# Patient Record
Sex: MALE | Race: ASIAN | NOT HISPANIC OR LATINO | URBAN - METROPOLITAN AREA
[De-identification: names, ages, dates, MRNs, and addresses within clinical notes are randomized per-mention and may not be internally consistent; named-entity substitution may affect disease eponyms.]

---

## 2021-06-01 VITALS
DIASTOLIC BLOOD PRESSURE: 66 MMHG | HEART RATE: 52 BPM | OXYGEN SATURATION: 98 % | RESPIRATION RATE: 16 BRPM | TEMPERATURE: 98 F | SYSTOLIC BLOOD PRESSURE: 141 MMHG

## 2021-06-01 NOTE — H&P ADULT - NSHPLABSRESULTS_GEN_ALL_CORE
13.8   7.62  )-----------( 223      ( 02 Jun 2021 09:58 )             42.7   PT/INR - ( 02 Jun 2021 09:58 )   PT: 10.8 sec;   INR: 0.90          PTT - ( 02 Jun 2021 09:58 )  PTT:31.2 sec 13.8   7.62  )-----------( 223      ( 02 Jun 2021 09:58 )             42.7   PT/INR - ( 02 Jun 2021 09:58 )   PT: 10.8 sec;   INR: 0.90          PTT - ( 02 Jun 2021 09:58 )  PTT:31.2 sec    06-02    140  |  102  |  15  ----------------------------<  147<H>  4.2   |  25  |  1.63<H>    Ca    9.6      02 Jun 2021 09:58    TPro  7.8  /  Alb  4.4  /  TBili  0.3  /  DBili  x   /  AST  17  /  ALT  20  /  AlkPhos  101  06-02

## 2021-06-01 NOTE — H&P ADULT - HISTORY OF PRESENT ILLNESS
COVID: _________   Cardiologist: Dr. Ray Albrecht  Pharmacy: [facility], [phone]  Escort: ________    60 y/o male, current smoker, w/ PMHx HTN, HLD, type II DM, CAD s/p CABG (in 2003 w/ 3 subsequent caths and 2 subsequent interventions, most recently in 2020 at [facility]), and known carotid bruit (mild plaque and patent flow bilaterally by ultrasound in 04/2021) who presented to outpatient cardiologist, Dr. Ray Albrecht, c/o intermittent, substernal chest pain, described as pressure and non-radiating, gradually worsening, experienced w/ exertion of less than 2 blocks and 1 flight of stairs, and relieved w/ rest. Patient also reports associated dyspnea w/ moderate physical exertion and relieved w/ rest. Patient denies any dizziness, syncope, palpitations, orthopnea/PND, abdominal pain, nausea/vomiting/diarrhea, melena, LE edema, fevers/chills/cough, recent travel, and known sick contacts. Echocardiogram (04/12/2021) showed EF 50%, global LV hypokinesis, grade I diastolic dysfunction, moderate concentric LV hypertrophy, mild TR, mildly dilated  LA, no pericardial effusion, no pleural effusion, and no pulmonary hypertension. Nuclear Stress (04/12/2021) revealed small perfusion abnormality of mild intensity in inferior region which was reversible in rest images, post-stress EF 42%, and global hypokinesis.     In light of patient's risk factors, CCS Class III anginal symptoms, and abnormal Nuclear Stress results, patient is now referred for cardiac catheterization w/ possible intervention if clinically indicated.  COVID: _________   Cardiologist: Dr. Ray Albrecht  Pharmacy: [facility], [phone]  Escort: ________    Medication list faxed from pharmacy   60 y/o male, current smoker, w/ PMHx HTN, HLD, type II DM, CAD s/p CABG (in 2003 w/ 3 subsequent caths and 2 subsequent interventions, most recently in 2020 at [facility]), and known carotid bruit (mild plaque and patent flow bilaterally by ultrasound in 04/2021) who presented to outpatient cardiologist, Dr. Ray Albrecht, c/o intermittent, substernal chest pain, described as pressure and non-radiating, gradually worsening, experienced w/ exertion of less than 2 blocks and 1 flight of stairs, and relieved w/ rest. Patient also reports associated dyspnea w/ moderate physical exertion and relieved w/ rest. Patient denies any dizziness, syncope, palpitations, orthopnea/PND, abdominal pain, nausea/vomiting/diarrhea, melena, LE edema, fevers/chills/cough, recent travel, and known sick contacts. Echocardiogram (04/12/2021) showed EF 50%, global LV hypokinesis, grade I diastolic dysfunction, moderate concentric LV hypertrophy, mild TR, mildly dilated  LA, no pericardial effusion, no pleural effusion, and no pulmonary hypertension. Nuclear Stress (04/12/2021) revealed small perfusion abnormality of mild intensity in inferior region which was reversible in rest images, post-stress EF 42%, and global hypokinesis.     In light of patient's risk factors, CCS Class III anginal symptoms, and abnormal Nuclear Stress results, patient is now referred for cardiac catheterization w/ possible intervention if clinically indicated.  COVID: _________   Cardiologist: Dr. Ray Albrecht  Pharmacy: [facility], [phone]  Escort: ________      CR 1.5 from labs!! HYDRATION !!    Medication list faxed from pharmacy   62 y/o male, current smoker, w/ PMHx HTN, HLD, type II DM, CAD s/p CABG (in 2003 w/ 3 subsequent caths and 2 subsequent interventions, most recently in 2020 at [facility]), and known carotid bruit (mild plaque and patent flow bilaterally by ultrasound in 04/2021) who presented to outpatient cardiologist, Dr. Ray Albrecht, c/o intermittent, substernal chest pain, described as pressure and non-radiating, gradually worsening, experienced w/ exertion of less than 2 blocks and 1 flight of stairs, and relieved w/ rest. Patient also reports associated dyspnea w/ moderate physical exertion and relieved w/ rest. Patient denies any dizziness, syncope, palpitations, orthopnea/PND, abdominal pain, nausea/vomiting/diarrhea, melena, LE edema, fevers/chills/cough, recent travel, and known sick contacts. Echocardiogram (04/12/2021) showed EF 50%, global LV hypokinesis, grade I diastolic dysfunction, moderate concentric LV hypertrophy, mild TR, mildly dilated  LA, no pericardial effusion, no pleural effusion, and no pulmonary hypertension. Nuclear Stress (04/12/2021) revealed small perfusion abnormality of mild intensity in inferior region which was reversible in rest images, post-stress EF 42%, and global hypokinesis.     In light of patient's risk factors, CCS Class III anginal symptoms, and abnormal Nuclear Stress results, patient is now referred for cardiac catheterization w/ possible intervention if clinically indicated.  COVID: Moderna X 2 (last dose 4/29/21)   Cardiologist: Dr. Ray Albrecht  Pharmacy: [facility], [phone]      CR 1.5 from labs!! HYDRATION !!    Medication list faxed from pharmacy reviewed with patient  62 y/o male, current smoker, w/ PMHx HTN, HLD, type II DM, CAD s/p CABG (in 2003 w/ 3 subsequent caths and 2 subsequent interventions, most recently in 2020 at [facility]), and known carotid bruit (mild plaque and patent flow bilaterally by ultrasound in 04/2021) who presented to outpatient cardiologist, Dr. Ray Albrecht, c/o intermittent, substernal chest pain, described as pressure and non-radiating, gradually worsening, experienced w/ exertion of less than 2 blocks and 1 flight of stairs, and relieved w/ rest. Patient also reports associated dyspnea w/ moderate physical exertion and relieved w/ rest. Patient denies any dizziness, syncope, palpitations, orthopnea/PND, abdominal pain, nausea/vomiting/diarrhea, melena, LE edema, fevers/chills/cough, recent travel, and known sick contacts. Echocardiogram (04/12/2021) showed EF 50%, global LV hypokinesis, grade I diastolic dysfunction, moderate concentric LV hypertrophy, mild TR, mildly dilated  LA, no pericardial effusion, no pleural effusion, and no pulmonary hypertension. Nuclear Stress (04/12/2021) revealed small perfusion abnormality of mild intensity in inferior region which was reversible in rest images, post-stress EF 42%, and global hypokinesis.     In light of patient's risk factors, CCS Class III anginal symptoms, and abnormal Nuclear Stress results, patient is now referred for cardiac catheterization w/ possible intervention if clinically indicated.

## 2021-06-01 NOTE — H&P ADULT - ADDITIONAL PE
EKG today: SB @ 54 with TWI v3-v6, AVL  o/p EKG: SB with LVH, ST depression v3-v6 and AVL, ST elevation lead 3

## 2021-06-01 NOTE — H&P ADULT - NSICDXPASTMEDICALHX_GEN_ALL_CORE_FT
PAST MEDICAL HISTORY:  CAD (coronary artery disease)     Diabetes     Gastritis     Hyperlipemia     Hypertension     S/P CABG (coronary artery bypass graft) in 2003, [facility]

## 2021-06-01 NOTE — H&P ADULT - NSHPSOCIALHISTORY_GEN_ALL_CORE
Patient is a current everyday smoker. current everyday smoker. smokes 5 cig /day x 20 years  denies any ETOH or illicit drug use

## 2021-06-01 NOTE — H&P ADULT - NSICDXFAMILYHX_GEN_ALL_CORE_FT
FAMILY HISTORY:  Sibling  Still living? Unknown  FH: CAD (coronary artery disease), Age at diagnosis: Age Unknown

## 2021-06-01 NOTE — H&P ADULT - ASSESSMENT
60 y/o male, current smoker, w/ PMHx HTN, HLD, type II DM, CAD s/p CABG (in 2003 w/ 3 subsequent caths and 2 subsequent interventions, most recently in 2020 at [facility]), and known carotid bruit (mild plaque and patent flow bilaterally by ultrasound in 04/2021) who is now referred for cardiac catheterization w/ possible intervention if clinically indicated 2/2 patient's risk factors, CCS Class III anginal symptoms, and abnormal Nuclear Stress results,      H/H 13.8/42.7 . Pt denies bleeding, GI bleeding, hematemesis, hematuria, BRBPR or melena .Pt. loaded with  mg Po x 1 and Plavix 600 mg PO x 1 pre-cath.  Cr. from o/p lab 1.5; IV NS@100  cc/hr pre-cath.  Pt. precath/consented with [employer] ID # [id number]    Risks & benefits of procedure and alternative therapy have been explained to the patient including but not limited to: allergic reaction, bleeding w/possible need for blood transfusion, infection, renal and vascular compromise, limb damage, arrhythmia, stroke, vessel dissection/perforation, Myocardial infarction, emergent CABG. Informed consent obtained and in chart   62 y/o male, current smoker, w/ PMHx HTN, HLD, type II DM, CAD s/p CABG (in 2003 w/ 3 subsequent caths and 2 subsequent interventions, most recently in 2020 at [facility]), and known carotid bruit (mild plaque and patent flow bilaterally by ultrasound in 04/2021) who is now referred for cardiac catheterization w/ possible intervention if clinically indicated 2/2 patient's risk factors, CCS Class III anginal symptoms, and abnormal Nuclear Stress results,      H/H 13.8/42.7 . Pt denies bleeding, GI bleeding, hematemesis, hematuria, BRBPR or melena .Pt. loaded with  mg Po x 1 and Plavix 600 mg PO x 1 pre-cath.  Cr. 1.6 today; Cr from o/p lab 1.5; IV  CC bolus X 1 to be c/w NS @100  cc/hr pre-cath.  Pt. precath/consented with [employer] ID # [id number]    Risks & benefits of procedure and alternative therapy have been explained to the patient including but not limited to: allergic reaction, bleeding w/possible need for blood transfusion, infection, renal and vascular compromise, limb damage, arrhythmia, stroke, vessel dissection/perforation, Myocardial infarction, emergent CABG. Informed consent obtained and in chart

## 2021-06-02 ENCOUNTER — OUTPATIENT (OUTPATIENT)
Dept: OUTPATIENT SERVICES | Facility: HOSPITAL | Age: 61
LOS: 1 days | Discharge: NURSING, REHAB OR LONG-TERM CARE | End: 2021-06-02
Payer: MEDICAID

## 2021-06-02 DIAGNOSIS — Z95.1 PRESENCE OF AORTOCORONARY BYPASS GRAFT: Chronic | ICD-10-CM

## 2021-06-02 LAB
A1C WITH ESTIMATED AVERAGE GLUCOSE RESULT: 7.9 % — HIGH (ref 4–5.6)
ALBUMIN SERPL ELPH-MCNC: 4.4 G/DL — SIGNIFICANT CHANGE UP (ref 3.3–5)
ALP SERPL-CCNC: 101 U/L — SIGNIFICANT CHANGE UP (ref 40–120)
ALT FLD-CCNC: 20 U/L — SIGNIFICANT CHANGE UP (ref 10–45)
ANION GAP SERPL CALC-SCNC: 13 MMOL/L — SIGNIFICANT CHANGE UP (ref 5–17)
APTT BLD: 31.2 SEC — SIGNIFICANT CHANGE UP (ref 27.5–35.5)
AST SERPL-CCNC: 17 U/L — SIGNIFICANT CHANGE UP (ref 10–40)
BASOPHILS # BLD AUTO: 0.07 K/UL — SIGNIFICANT CHANGE UP (ref 0–0.2)
BASOPHILS NFR BLD AUTO: 0.9 % — SIGNIFICANT CHANGE UP (ref 0–2)
BILIRUB SERPL-MCNC: 0.3 MG/DL — SIGNIFICANT CHANGE UP (ref 0.2–1.2)
BUN SERPL-MCNC: 15 MG/DL — SIGNIFICANT CHANGE UP (ref 7–23)
CALCIUM SERPL-MCNC: 9.6 MG/DL — SIGNIFICANT CHANGE UP (ref 8.4–10.5)
CHLORIDE SERPL-SCNC: 102 MMOL/L — SIGNIFICANT CHANGE UP (ref 96–108)
CHOLEST SERPL-MCNC: 147 MG/DL — SIGNIFICANT CHANGE UP
CK MB CFR SERPL CALC: 2.7 NG/ML — SIGNIFICANT CHANGE UP (ref 0–6.7)
CK SERPL-CCNC: 91 U/L — SIGNIFICANT CHANGE UP (ref 30–200)
CO2 SERPL-SCNC: 25 MMOL/L — SIGNIFICANT CHANGE UP (ref 22–31)
CREAT SERPL-MCNC: 1.63 MG/DL — HIGH (ref 0.5–1.3)
EOSINOPHIL # BLD AUTO: 0.57 K/UL — HIGH (ref 0–0.5)
EOSINOPHIL NFR BLD AUTO: 7.5 % — HIGH (ref 0–6)
ESTIMATED AVERAGE GLUCOSE: 180 MG/DL — HIGH (ref 68–114)
GLUCOSE SERPL-MCNC: 147 MG/DL — HIGH (ref 70–99)
HCT VFR BLD CALC: 42.7 % — SIGNIFICANT CHANGE UP (ref 39–50)
HDLC SERPL-MCNC: 50 MG/DL — SIGNIFICANT CHANGE UP
HGB BLD-MCNC: 13.8 G/DL — SIGNIFICANT CHANGE UP (ref 13–17)
IMM GRANULOCYTES NFR BLD AUTO: 0.3 % — SIGNIFICANT CHANGE UP (ref 0–1.5)
INR BLD: 0.9 — SIGNIFICANT CHANGE UP (ref 0.88–1.16)
LIPID PNL WITH DIRECT LDL SERPL: 60 MG/DL — SIGNIFICANT CHANGE UP
LYMPHOCYTES # BLD AUTO: 1.85 K/UL — SIGNIFICANT CHANGE UP (ref 1–3.3)
LYMPHOCYTES # BLD AUTO: 24.3 % — SIGNIFICANT CHANGE UP (ref 13–44)
MCHC RBC-ENTMCNC: 27.5 PG — SIGNIFICANT CHANGE UP (ref 27–34)
MCHC RBC-ENTMCNC: 32.3 GM/DL — SIGNIFICANT CHANGE UP (ref 32–36)
MCV RBC AUTO: 85.1 FL — SIGNIFICANT CHANGE UP (ref 80–100)
MONOCYTES # BLD AUTO: 0.59 K/UL — SIGNIFICANT CHANGE UP (ref 0–0.9)
MONOCYTES NFR BLD AUTO: 7.7 % — SIGNIFICANT CHANGE UP (ref 2–14)
NEUTROPHILS # BLD AUTO: 4.52 K/UL — SIGNIFICANT CHANGE UP (ref 1.8–7.4)
NEUTROPHILS NFR BLD AUTO: 59.3 % — SIGNIFICANT CHANGE UP (ref 43–77)
NON HDL CHOLESTEROL: 97 MG/DL — SIGNIFICANT CHANGE UP
NRBC # BLD: 0 /100 WBCS — SIGNIFICANT CHANGE UP (ref 0–0)
PLATELET # BLD AUTO: 223 K/UL — SIGNIFICANT CHANGE UP (ref 150–400)
POTASSIUM SERPL-MCNC: 4.2 MMOL/L — SIGNIFICANT CHANGE UP (ref 3.5–5.3)
POTASSIUM SERPL-SCNC: 4.2 MMOL/L — SIGNIFICANT CHANGE UP (ref 3.5–5.3)
PROT SERPL-MCNC: 7.8 G/DL — SIGNIFICANT CHANGE UP (ref 6–8.3)
PROTHROM AB SERPL-ACNC: 10.8 SEC — SIGNIFICANT CHANGE UP (ref 10.6–13.6)
RBC # BLD: 5.02 M/UL — SIGNIFICANT CHANGE UP (ref 4.2–5.8)
RBC # FLD: 15.4 % — HIGH (ref 10.3–14.5)
SODIUM SERPL-SCNC: 140 MMOL/L — SIGNIFICANT CHANGE UP (ref 135–145)
TRIGL SERPL-MCNC: 187 MG/DL — HIGH
WBC # BLD: 7.62 K/UL — SIGNIFICANT CHANGE UP (ref 3.8–10.5)
WBC # FLD AUTO: 7.62 K/UL — SIGNIFICANT CHANGE UP (ref 3.8–10.5)

## 2021-06-02 PROCEDURE — 82553 CREATINE MB FRACTION: CPT

## 2021-06-02 PROCEDURE — 85025 COMPLETE CBC W/AUTO DIFF WBC: CPT

## 2021-06-02 PROCEDURE — C1725: CPT

## 2021-06-02 PROCEDURE — 80061 LIPID PANEL: CPT

## 2021-06-02 PROCEDURE — 80053 COMPREHEN METABOLIC PANEL: CPT

## 2021-06-02 PROCEDURE — 83036 HEMOGLOBIN GLYCOSYLATED A1C: CPT

## 2021-06-02 PROCEDURE — 85730 THROMBOPLASTIN TIME PARTIAL: CPT

## 2021-06-02 PROCEDURE — 93455 CORONARY ART/GRFT ANGIO S&I: CPT

## 2021-06-02 PROCEDURE — C1894: CPT

## 2021-06-02 PROCEDURE — 99152 MOD SED SAME PHYS/QHP 5/>YRS: CPT

## 2021-06-02 PROCEDURE — C1769: CPT

## 2021-06-02 PROCEDURE — 99153 MOD SED SAME PHYS/QHP EA: CPT

## 2021-06-02 PROCEDURE — C1887: CPT

## 2021-06-02 PROCEDURE — 93005 ELECTROCARDIOGRAM TRACING: CPT

## 2021-06-02 PROCEDURE — 82550 ASSAY OF CK (CPK): CPT

## 2021-06-02 PROCEDURE — 93010 ELECTROCARDIOGRAM REPORT: CPT

## 2021-06-02 PROCEDURE — 85610 PROTHROMBIN TIME: CPT

## 2021-06-02 RX ORDER — METFORMIN HYDROCHLORIDE 850 MG/1
1 TABLET ORAL
Qty: 0 | Refills: 0 | DISCHARGE

## 2021-06-02 RX ORDER — RANOLAZINE 500 MG/1
1 TABLET, FILM COATED, EXTENDED RELEASE ORAL
Qty: 0 | Refills: 0 | DISCHARGE

## 2021-06-02 RX ORDER — SODIUM CHLORIDE 9 MG/ML
500 INJECTION INTRAMUSCULAR; INTRAVENOUS; SUBCUTANEOUS
Refills: 0 | Status: DISCONTINUED | OUTPATIENT
Start: 2021-06-02 | End: 2021-06-02

## 2021-06-02 RX ORDER — NICOTINE POLACRILEX 2 MG
1 GUM BUCCAL
Qty: 0 | Refills: 0 | DISCHARGE

## 2021-06-02 RX ORDER — NITROGLYCERIN 6.5 MG
0 CAPSULE, EXTENDED RELEASE ORAL
Qty: 0 | Refills: 0 | DISCHARGE

## 2021-06-02 RX ORDER — ATORVASTATIN CALCIUM 80 MG/1
1 TABLET, FILM COATED ORAL
Qty: 0 | Refills: 0 | DISCHARGE

## 2021-06-02 RX ORDER — CLOPIDOGREL BISULFATE 75 MG/1
600 TABLET, FILM COATED ORAL ONCE
Refills: 0 | Status: COMPLETED | OUTPATIENT
Start: 2021-06-02 | End: 2021-06-02

## 2021-06-02 RX ORDER — ASPIRIN/CALCIUM CARB/MAGNESIUM 324 MG
325 TABLET ORAL ONCE
Refills: 0 | Status: COMPLETED | OUTPATIENT
Start: 2021-06-02 | End: 2021-06-02

## 2021-06-02 RX ORDER — VALSARTAN 80 MG/1
1 TABLET ORAL
Qty: 0 | Refills: 0 | DISCHARGE

## 2021-06-02 RX ORDER — ACETAMINOPHEN 500 MG
650 TABLET ORAL ONCE
Refills: 0 | Status: COMPLETED | OUTPATIENT
Start: 2021-06-02 | End: 2021-06-02

## 2021-06-02 RX ORDER — ASPIRIN/CALCIUM CARB/MAGNESIUM 324 MG
1 TABLET ORAL
Qty: 0 | Refills: 0 | DISCHARGE

## 2021-06-02 RX ORDER — SODIUM CHLORIDE 9 MG/ML
250 INJECTION INTRAMUSCULAR; INTRAVENOUS; SUBCUTANEOUS ONCE
Refills: 0 | Status: COMPLETED | OUTPATIENT
Start: 2021-06-02 | End: 2021-06-02

## 2021-06-02 RX ORDER — AMLODIPINE BESYLATE 2.5 MG/1
1 TABLET ORAL
Qty: 0 | Refills: 0 | DISCHARGE

## 2021-06-02 RX ORDER — CHLORHEXIDINE GLUCONATE 213 G/1000ML
1 SOLUTION TOPICAL ONCE
Refills: 0 | Status: DISCONTINUED | OUTPATIENT
Start: 2021-06-02 | End: 2021-06-02

## 2021-06-02 RX ADMIN — Medication 650 MILLIGRAM(S): at 17:20

## 2021-06-02 RX ADMIN — CLOPIDOGREL BISULFATE 600 MILLIGRAM(S): 75 TABLET, FILM COATED ORAL at 10:41

## 2021-06-02 RX ADMIN — SODIUM CHLORIDE 500 MILLILITER(S): 9 INJECTION INTRAMUSCULAR; INTRAVENOUS; SUBCUTANEOUS at 10:52

## 2021-06-02 RX ADMIN — Medication 325 MILLIGRAM(S): at 10:41

## 2021-06-02 RX ADMIN — SODIUM CHLORIDE 100 MILLILITER(S): 9 INJECTION INTRAMUSCULAR; INTRAVENOUS; SUBCUTANEOUS at 10:41

## 2021-06-02 NOTE — PROGRESS NOTE ADULT - SUBJECTIVE AND OBJECTIVE BOX
Interventional Cardiology PA SDA Discharge Note    Patient without complaints. Ambulated and voided without difficulties    Afebrile, VSS    Ext: Left Ulnar: no hematoma, no bleeding, dressing; C/D/I, intact pulses     A/P:  62 y/o male, current smoker, w/ PMHx HTN, HLD, type II DM, CAD s/p CABG (in 2003 w/ 3 subsequent caths and 2 subsequent interventions, most recently in 2020 at [facility]), and known carotid bruit (mild plaque and patent flow bilaterally by ultrasound in 04/2021) who is now referred for cardiac catheterization w/ possible intervention if clinically indicated 2/2 patient's risk factors, CCS Class III anginal symptoms, and abnormal Nuclear Stress results.  Pt is now s/p cardiac cath on 6/2/21 w/ unsuccessful PCI to pLCx . Other findings: patent LIMA-LAD, patent Rad-Diag, SVG-RPDA, EDP 9. Plan to return in 4 weeks for staged PCI of pLCX .    1.	Stable for discharge as per attending Dr. Albrecht after bed rest, pt voids, wrist stable and 30 minutes of ambulation.  2.	Follow-up with PMD/Cardiologist Dr Albrecht in 1-2 weeks  3.	Discharged forms signed and copies in chart    Interventional Cardiology PA SDA Discharge Note    Patient without complaints. Ambulated and voided without difficulties    Afebrile, VSS    Ext: Left Ulnar: no hematoma, no bleeding, dressing; C/D/I, intact pulses     A/P:  62 y/o male, current smoker, w/ PMHx HTN, HLD, type II DM, CAD s/p CABG (in 2003 w/ 3 subsequent caths and 2 subsequent interventions, most recently in 2020 at [facility]), and known carotid bruit (mild plaque and patent flow bilaterally by ultrasound in 04/2021) who is now referred for cardiac catheterization w/ possible intervention if clinically indicated 2/2 patient's risk factors, CCS Class III anginal symptoms, and abnormal Nuclear Stress results.  Pt is now s/p cardiac cath on 6/2/21 w/ unsuccessful PCI to pLCx . Other findings: LM mild luminal irregularities, mLAD 100%, D1 95%, pLCx  w/ L-L collaterals, pRCA 100%, patent LIMA-LAD, patent free Rad-D1, SVG-RPDA, EDP 9. Plan to return in 4-6 weeks for staged PCI of pLCX .    1.	Stable for discharge as per attending Dr. Albrecht after bed rest, pt voids, wrist stable and 30 minutes of ambulation.  2.	Follow-up with PMD/Cardiologist Dr Albrecht in 1-2 weeks  3.	Discharged forms signed and copies in chart

## 2021-06-04 DIAGNOSIS — I25.10 ATHEROSCLEROTIC HEART DISEASE OF NATIVE CORONARY ARTERY WITHOUT ANGINA PECTORIS: ICD-10-CM

## 2021-06-04 DIAGNOSIS — Z95.1 PRESENCE OF AORTOCORONARY BYPASS GRAFT: ICD-10-CM

## 2021-06-04 DIAGNOSIS — I25.84 CORONARY ATHEROSCLEROSIS DUE TO CALCIFIED CORONARY LESION: ICD-10-CM

## 2021-06-04 DIAGNOSIS — Z82.49 FAMILY HISTORY OF ISCHEMIC HEART DISEASE AND OTHER DISEASES OF THE CIRCULATORY SYSTEM: ICD-10-CM

## 2021-06-05 ENCOUNTER — INPATIENT (INPATIENT)
Facility: HOSPITAL | Age: 61
LOS: 3 days | Discharge: HOME | End: 2021-06-09
Attending: HOSPITALIST | Admitting: HOSPITALIST
Payer: MEDICAID

## 2021-06-05 VITALS
HEART RATE: 78 BPM | SYSTOLIC BLOOD PRESSURE: 114 MMHG | OXYGEN SATURATION: 100 % | DIASTOLIC BLOOD PRESSURE: 63 MMHG | TEMPERATURE: 99 F | RESPIRATION RATE: 16 BRPM | HEIGHT: 67 IN

## 2021-06-05 DIAGNOSIS — Z95.1 PRESENCE OF AORTOCORONARY BYPASS GRAFT: Chronic | ICD-10-CM

## 2021-06-05 DIAGNOSIS — R07.9 CHEST PAIN, UNSPECIFIED: ICD-10-CM

## 2021-06-05 DIAGNOSIS — E11.9 TYPE 2 DIABETES MELLITUS WITHOUT COMPLICATIONS: ICD-10-CM

## 2021-06-05 DIAGNOSIS — E78.5 HYPERLIPIDEMIA, UNSPECIFIED: ICD-10-CM

## 2021-06-05 DIAGNOSIS — I24.9 ACUTE ISCHEMIC HEART DISEASE, UNSPECIFIED: ICD-10-CM

## 2021-06-05 DIAGNOSIS — N18.9 CHRONIC KIDNEY DISEASE, UNSPECIFIED: ICD-10-CM

## 2021-06-05 DIAGNOSIS — I10 ESSENTIAL (PRIMARY) HYPERTENSION: ICD-10-CM

## 2021-06-05 LAB
A1C WITH ESTIMATED AVERAGE GLUCOSE RESULT: 8.1 % — HIGH (ref 4–5.6)
ALBUMIN SERPL ELPH-MCNC: 4 G/DL — SIGNIFICANT CHANGE UP (ref 3.3–5)
ALP SERPL-CCNC: 90 U/L — SIGNIFICANT CHANGE UP (ref 40–120)
ALT FLD-CCNC: 15 U/L — SIGNIFICANT CHANGE UP (ref 4–41)
ANION GAP SERPL CALC-SCNC: 12 MMOL/L — SIGNIFICANT CHANGE UP (ref 7–14)
APTT BLD: 31.6 SEC — SIGNIFICANT CHANGE UP (ref 27–36.3)
AST SERPL-CCNC: 13 U/L — SIGNIFICANT CHANGE UP (ref 4–40)
BASOPHILS # BLD AUTO: 0.04 K/UL — SIGNIFICANT CHANGE UP (ref 0–0.2)
BASOPHILS NFR BLD AUTO: 0.4 % — SIGNIFICANT CHANGE UP (ref 0–2)
BILIRUB SERPL-MCNC: 0.3 MG/DL — SIGNIFICANT CHANGE UP (ref 0.2–1.2)
BUN SERPL-MCNC: 11 MG/DL — SIGNIFICANT CHANGE UP (ref 7–23)
CALCIUM SERPL-MCNC: 8.8 MG/DL — SIGNIFICANT CHANGE UP (ref 8.4–10.5)
CHLORIDE SERPL-SCNC: 102 MMOL/L — SIGNIFICANT CHANGE UP (ref 98–107)
CHOLEST SERPL-MCNC: 132 MG/DL — SIGNIFICANT CHANGE UP
CK MB BLD-MCNC: 2.9 % — HIGH (ref 0–2.5)
CK MB CFR SERPL CALC: 1.8 NG/ML — SIGNIFICANT CHANGE UP
CK MB CFR SERPL CALC: 2 NG/ML — SIGNIFICANT CHANGE UP
CK SERPL-CCNC: 70 U/L — SIGNIFICANT CHANGE UP (ref 30–200)
CO2 SERPL-SCNC: 22 MMOL/L — SIGNIFICANT CHANGE UP (ref 22–31)
CREAT SERPL-MCNC: 1.42 MG/DL — HIGH (ref 0.5–1.3)
EOSINOPHIL # BLD AUTO: 0.58 K/UL — HIGH (ref 0–0.5)
EOSINOPHIL NFR BLD AUTO: 5.9 % — SIGNIFICANT CHANGE UP (ref 0–6)
ESTIMATED AVERAGE GLUCOSE: 186 MG/DL — HIGH (ref 68–114)
GLUCOSE SERPL-MCNC: 291 MG/DL — HIGH (ref 70–99)
HCT VFR BLD CALC: 36.4 % — LOW (ref 39–50)
HDLC SERPL-MCNC: 49 MG/DL — SIGNIFICANT CHANGE UP
HGB BLD-MCNC: 11.9 G/DL — LOW (ref 13–17)
IANC: 7.07 K/UL — SIGNIFICANT CHANGE UP (ref 1.5–8.5)
IMM GRANULOCYTES NFR BLD AUTO: 0.3 % — SIGNIFICANT CHANGE UP (ref 0–1.5)
INR BLD: 0.95 RATIO — SIGNIFICANT CHANGE UP (ref 0.88–1.16)
LIPID PNL WITH DIRECT LDL SERPL: 63 MG/DL — SIGNIFICANT CHANGE UP
LYMPHOCYTES # BLD AUTO: 1.29 K/UL — SIGNIFICANT CHANGE UP (ref 1–3.3)
LYMPHOCYTES # BLD AUTO: 13.2 % — SIGNIFICANT CHANGE UP (ref 13–44)
MCHC RBC-ENTMCNC: 27.5 PG — SIGNIFICANT CHANGE UP (ref 27–34)
MCHC RBC-ENTMCNC: 32.7 GM/DL — SIGNIFICANT CHANGE UP (ref 32–36)
MCV RBC AUTO: 84.3 FL — SIGNIFICANT CHANGE UP (ref 80–100)
MONOCYTES # BLD AUTO: 0.79 K/UL — SIGNIFICANT CHANGE UP (ref 0–0.9)
MONOCYTES NFR BLD AUTO: 8.1 % — SIGNIFICANT CHANGE UP (ref 2–14)
NEUTROPHILS # BLD AUTO: 7.07 K/UL — SIGNIFICANT CHANGE UP (ref 1.8–7.4)
NEUTROPHILS NFR BLD AUTO: 72.1 % — SIGNIFICANT CHANGE UP (ref 43–77)
NON HDL CHOLESTEROL: 83 MG/DL — SIGNIFICANT CHANGE UP
NRBC # BLD: 0 /100 WBCS — SIGNIFICANT CHANGE UP
NRBC # FLD: 0 K/UL — SIGNIFICANT CHANGE UP
NT-PROBNP SERPL-SCNC: 706 PG/ML — HIGH
PLATELET # BLD AUTO: 199 K/UL — SIGNIFICANT CHANGE UP (ref 150–400)
POTASSIUM SERPL-MCNC: 4.2 MMOL/L — SIGNIFICANT CHANGE UP (ref 3.5–5.3)
POTASSIUM SERPL-SCNC: 4.2 MMOL/L — SIGNIFICANT CHANGE UP (ref 3.5–5.3)
PROT SERPL-MCNC: 7.3 G/DL — SIGNIFICANT CHANGE UP (ref 6–8.3)
PROTHROM AB SERPL-ACNC: 10.9 SEC — SIGNIFICANT CHANGE UP (ref 10.6–13.6)
RBC # BLD: 4.32 M/UL — SIGNIFICANT CHANGE UP (ref 4.2–5.8)
RBC # FLD: 15.2 % — HIGH (ref 10.3–14.5)
SARS-COV-2 RNA SPEC QL NAA+PROBE: SIGNIFICANT CHANGE UP
SODIUM SERPL-SCNC: 136 MMOL/L — SIGNIFICANT CHANGE UP (ref 135–145)
TRIGL SERPL-MCNC: 99 MG/DL — SIGNIFICANT CHANGE UP
TROPONIN T, HIGH SENSITIVITY RESULT: 33 NG/L — SIGNIFICANT CHANGE UP
TROPONIN T, HIGH SENSITIVITY RESULT: 34 NG/L — SIGNIFICANT CHANGE UP
WBC # BLD: 9.8 K/UL — SIGNIFICANT CHANGE UP (ref 3.8–10.5)
WBC # FLD AUTO: 9.8 K/UL — SIGNIFICANT CHANGE UP (ref 3.8–10.5)

## 2021-06-05 PROCEDURE — 99223 1ST HOSP IP/OBS HIGH 75: CPT

## 2021-06-05 PROCEDURE — 99291 CRITICAL CARE FIRST HOUR: CPT | Mod: 25

## 2021-06-05 PROCEDURE — 93010 ELECTROCARDIOGRAM REPORT: CPT

## 2021-06-05 PROCEDURE — 71045 X-RAY EXAM CHEST 1 VIEW: CPT | Mod: 26

## 2021-06-05 RX ORDER — METOPROLOL TARTRATE 50 MG
50 TABLET ORAL DAILY
Refills: 0 | Status: DISCONTINUED | OUTPATIENT
Start: 2021-06-05 | End: 2021-06-06

## 2021-06-05 RX ORDER — SODIUM CHLORIDE 9 MG/ML
1000 INJECTION, SOLUTION INTRAVENOUS
Refills: 0 | Status: DISCONTINUED | OUTPATIENT
Start: 2021-06-05 | End: 2021-06-09

## 2021-06-05 RX ORDER — DEXTROSE 50 % IN WATER 50 %
25 SYRINGE (ML) INTRAVENOUS ONCE
Refills: 0 | Status: DISCONTINUED | OUTPATIENT
Start: 2021-06-05 | End: 2021-06-09

## 2021-06-05 RX ORDER — ENOXAPARIN SODIUM 100 MG/ML
40 INJECTION SUBCUTANEOUS DAILY
Refills: 0 | Status: DISCONTINUED | OUTPATIENT
Start: 2021-06-05 | End: 2021-06-09

## 2021-06-05 RX ORDER — MELOXICAM 15 MG/1
1 TABLET ORAL
Qty: 0 | Refills: 0 | DISCHARGE

## 2021-06-05 RX ORDER — VALSARTAN 80 MG/1
80 TABLET ORAL DAILY
Refills: 0 | Status: DISCONTINUED | OUTPATIENT
Start: 2021-06-05 | End: 2021-06-09

## 2021-06-05 RX ORDER — NICOTINE POLACRILEX 2 MG
1 GUM BUCCAL
Qty: 0 | Refills: 0 | DISCHARGE

## 2021-06-05 RX ORDER — TICAGRELOR 90 MG/1
180 TABLET ORAL ONCE
Refills: 0 | Status: COMPLETED | OUTPATIENT
Start: 2021-06-05 | End: 2021-06-05

## 2021-06-05 RX ORDER — RANOLAZINE 500 MG/1
500 TABLET, FILM COATED, EXTENDED RELEASE ORAL
Refills: 0 | Status: DISCONTINUED | OUTPATIENT
Start: 2021-06-05 | End: 2021-06-09

## 2021-06-05 RX ORDER — ASPIRIN/CALCIUM CARB/MAGNESIUM 324 MG
81 TABLET ORAL DAILY
Refills: 0 | Status: DISCONTINUED | OUTPATIENT
Start: 2021-06-05 | End: 2021-06-09

## 2021-06-05 RX ORDER — DEXTROSE 50 % IN WATER 50 %
12.5 SYRINGE (ML) INTRAVENOUS ONCE
Refills: 0 | Status: DISCONTINUED | OUTPATIENT
Start: 2021-06-05 | End: 2021-06-09

## 2021-06-05 RX ORDER — AMLODIPINE BESYLATE 2.5 MG/1
10 TABLET ORAL DAILY
Refills: 0 | Status: DISCONTINUED | OUTPATIENT
Start: 2021-06-05 | End: 2021-06-09

## 2021-06-05 RX ORDER — INSULIN LISPRO 100/ML
VIAL (ML) SUBCUTANEOUS
Refills: 0 | Status: DISCONTINUED | OUTPATIENT
Start: 2021-06-05 | End: 2021-06-09

## 2021-06-05 RX ORDER — VALSARTAN 80 MG/1
1 TABLET ORAL
Qty: 0 | Refills: 0 | DISCHARGE

## 2021-06-05 RX ORDER — NICOTINE POLACRILEX 2 MG
1 GUM BUCCAL DAILY
Refills: 0 | Status: DISCONTINUED | OUTPATIENT
Start: 2021-06-05 | End: 2021-06-09

## 2021-06-05 RX ORDER — ASPIRIN/CALCIUM CARB/MAGNESIUM 324 MG
324 TABLET ORAL ONCE
Refills: 0 | Status: DISCONTINUED | OUTPATIENT
Start: 2021-06-05 | End: 2021-06-05

## 2021-06-05 RX ORDER — ASPIRIN/CALCIUM CARB/MAGNESIUM 324 MG
243 TABLET ORAL ONCE
Refills: 0 | Status: COMPLETED | OUTPATIENT
Start: 2021-06-05 | End: 2021-06-05

## 2021-06-05 RX ORDER — DEXTROSE 50 % IN WATER 50 %
15 SYRINGE (ML) INTRAVENOUS ONCE
Refills: 0 | Status: DISCONTINUED | OUTPATIENT
Start: 2021-06-05 | End: 2021-06-09

## 2021-06-05 RX ORDER — OMEPRAZOLE 10 MG/1
1 CAPSULE, DELAYED RELEASE ORAL
Qty: 0 | Refills: 0 | DISCHARGE

## 2021-06-05 RX ORDER — ATORVASTATIN CALCIUM 80 MG/1
80 TABLET, FILM COATED ORAL AT BEDTIME
Refills: 0 | Status: DISCONTINUED | OUTPATIENT
Start: 2021-06-05 | End: 2021-06-09

## 2021-06-05 RX ORDER — GLUCAGON INJECTION, SOLUTION 0.5 MG/.1ML
1 INJECTION, SOLUTION SUBCUTANEOUS ONCE
Refills: 0 | Status: DISCONTINUED | OUTPATIENT
Start: 2021-06-05 | End: 2021-06-09

## 2021-06-05 RX ADMIN — ENOXAPARIN SODIUM 40 MILLIGRAM(S): 100 INJECTION SUBCUTANEOUS at 13:16

## 2021-06-05 RX ADMIN — Medication 1 DROP(S): at 23:52

## 2021-06-05 RX ADMIN — ATORVASTATIN CALCIUM 80 MILLIGRAM(S): 80 TABLET, FILM COATED ORAL at 22:52

## 2021-06-05 RX ADMIN — Medication 1: at 13:18

## 2021-06-05 RX ADMIN — RANOLAZINE 500 MILLIGRAM(S): 500 TABLET, FILM COATED, EXTENDED RELEASE ORAL at 18:16

## 2021-06-05 RX ADMIN — Medication 1 PATCH: at 22:49

## 2021-06-05 RX ADMIN — Medication 2: at 16:40

## 2021-06-05 RX ADMIN — Medication 1 PATCH: at 13:17

## 2021-06-05 RX ADMIN — Medication 1 DROP(S): at 13:07

## 2021-06-05 RX ADMIN — Medication 243 MILLIGRAM(S): at 02:36

## 2021-06-05 RX ADMIN — TICAGRELOR 180 MILLIGRAM(S): 90 TABLET ORAL at 02:33

## 2021-06-05 RX ADMIN — Medication 81 MILLIGRAM(S): at 13:17

## 2021-06-05 NOTE — ED PROVIDER NOTE - PHYSICAL EXAMINATION
Gen: AAOx3, non-toxic  Head: NCAT  HEENT: EOMI, PERRLA, oral mucosa moist, normal conjunctiva  Lung: CTAB, no respiratory distress, no wheezes/rhonchi/rales B/L, speaking in full sentences  CV: RRR, no murmurs, rubs or gallops  Abd: soft, NTND, no guarding, no CVA tenderness, no rebound tenderness  MSK: no visible deformities, full range of motion of all 4 exts  Neuro: No focal sensory or motor deficits  Skin: Warm, well perfused, no rash  Psych: normal affect.   ~Tamara Osborn MD

## 2021-06-05 NOTE — ED ADULT NURSE NOTE - OBJECTIVE STATEMENT
Pt is a 61 year old male reporting to the ED for L side chest pain for 2 days.Pt is gilmar montes MD using  ipad.  Has angiogram 2 days ago at [place] and d/c yesterday. Reports L side chest pain increased after angiogram and is radiating to L side. Pt reports pain increased when deep breathing and on exertion. Pt is AOX4. Pt denies SOB. PT respirations even an unlabored. Pt placed on cardiac monitor, EKG done, spo2 100 % on room air. Pt denies fever, chills, n/v/d. Pt denies abdominal pain, dysuria, hematuria. 18 g iv placed in L ac, 20 g iv placed in L wrist by EMS. Labs drawn, pending review, report given to primary RN, jess.

## 2021-06-05 NOTE — PATIENT PROFILE ADULT - BRAND OF COVID-19 VACCINATION
Procedure note    Back abscess was cleaned, pus and sebum evacuated and packed with iodoform. 4 x 4 and tape utilized. He tolerated this well.   Abscess measuring 4 x 4 cm Pfizer dose 1 and 2

## 2021-06-05 NOTE — H&P ADULT - HISTORY OF PRESENT ILLNESS
62 y/o Wolof speaking male w/ PMHx HTN, HLD, type II DM, CKD, current smoker, CAD s/p CABG (in 2003 in [place] w/ 2 subsequent interventions, most recently in 2020 at [facility]), and known carotid bruit (mild plaque and patent flow bilaterally by ultrasound in 04/2021) Pt underwent Cardiac Cath on 6/2/21 in [facility] had unsuccessful PCI to pLCx  now p/w recurrent CP. Pt developed 8/10 severity, pressure/ stabbing on left side of chest radiating to his left arm ~9pm while he was pulling a foam mattress on his bed. He also felt accompanying SOB and mild nausea.  He called EMS and was given 1 SL NTG ~11pm with improvement. Pt admits to feeling similar CP occasionally for the last 4-5 months, experiences the CP & SOB with exertion, doing something or walking 4-5 blocks then symptoms dissipate slowly after stopping but also sometimes uses SL NTG once every 2-3 months when needed. Pt denies diaphoresis, palp's, cough, fevers/ chills or vomiting. No reproducible, pleuritic or dietary component to CP, admits sometimes CP is also positional, worse when lying down.   60 y/o Yakut speaking male w/ PMHx current smoker, CAD s/p CABG (in 2003 in [place] w/ 2 subsequent interventions, most recently in 2020 at [facility]), HTN, HLD, type II DM, ?CKD and known carotid bruit (mild plaque and patent flow bilaterally by ultrasound in 04/2021) Pt underwent Cardiac Cath on 6/2/21 in [facility] had unsuccessful PCI to pLCx  now p/w recurrent CP. Pt developed 8/10 severity, pressure/ stabbing on left side of chest radiating to his left arm ~9pm while he was pulling a foam mattress on his bed. He also felt accompanying SOB and mild nausea.  He called EMS and was given 1 SL NTG ~11pm with improvement. Pt admits to feeling similar CP occasionally for the last 4-5 months, experiences the CP & SOB with exertion, doing something or walking 4-5 blocks then symptoms dissipate slowly after stopping but also sometimes uses SL NTG once every 2-3 months when needed. Pt denies diaphoresis, palp's, cough, fevers/ chills or vomiting. No reproducible, pleuritic or dietary component to CP, admits sometimes CP is also positional, worse when lying down.

## 2021-06-05 NOTE — H&P ADULT - ATTENDING COMMENTS
61 y/M PMH of  CAD s/p CABG (in 2003 in [place] with  subsequent interventions  in 2020 at [facility], cardiac cath on 6/2/21 in [facility] had unsuccessful PCI to pLCx ), HTN, HLD, type II DM, ?CKD and known carotid bruit p/w recurrent CP.  No CP at present   # ACS- presenting with chest pain, initial troponin -33, EKG with  NSR -  70, old TWI v2, v4-6, I, L  s/p  Brilinta x1, s/p  continue ASA, BB, statin, ARB  Cardiology c/s in chart- check Echo. Planned for PCI per Cardio. FU  Cardio recs  # Type II DM- A1C- 7.9. Continue SSS

## 2021-06-05 NOTE — CONSULT NOTE ADULT - SUBJECTIVE AND OBJECTIVE BOX
HPI:  	   No Known Allergies      PAST MEDICAL & SURGICAL HISTORY:  CAD (coronary artery disease)  Hypertension  Diabetes  Hyperlipemia  S/P CABG (coronary artery bypass graft)  in 2003, [place]  Gastritis  S/P CABG (coronary artery bypass graft)  in 2003    FAMILY HISTORY:  FH: CAD (coronary artery disease) (Sibling)    SOCIAL HISTORY  Marital Status:   Occupation: unemployed  Lives with: family    SUBSTANCE USE  Tobacco Usage: active smoker however quit a few days ago    Alcohol Usage: occasional  Recreational drugs: denies    REVIEW OF SYSTEMS:  CONSTITUTIONAL: No fevers, No chills, No fatigue, No weight gain  RESPIRATORY: No shortness of breath, No cough, No wheezing, No hemoptysis  CARDIOVASCULAR: No chest pain. No palpitations, No pleuritic pain  GASTROINTESTINAL: No abdominal pain, No nausea, No vomiting, No hematemesis, No diarrhea No constipation. No melena  GENITOURINARY: No dysuria, No frequency, No incontinence, No hematuria  NEUROLOGICAL: No dizziness, No lightheadedness, No syncope, No LOC, No headache, No numbness or weakness  EXTREMITIES: No Edema, No joint pain, No joint swelling.  SKIN: No diaphoresis. No itching, No rashes, No pressure ulcers  All other review of systems is negative unless indicated above.    T(C): 37 (06-05-21 @ 05:10), Max: 37.3 (06-05-21 @ 01:32)  HR: 65 (06-05-21 @ 05:10) (65 - 78)  BP: 123/43 (06-05-21 @ 05:10) (114/63 - 130/59)  RR: 19 (06-05-21 @ 05:10) (16 - 19)  SpO2: 99% (06-05-21 @ 05:10) (99% - 100%)    Physical Exam:  General: NAD  Cardiovascular: Normal S1 S2, No JVD, No murmurs, No edema  Respiratory: Lungs clear to auscultation	  Gastrointestinal:  Soft, Non-tender, + BS	  Skin: warm and dry, No rashes, No ecchymoses, No cyanosis	  Extremities:  No clubbing, cyanosis or edema  Vascular: Peripheral pulses palpable 2+ bilaterally    CBC Full  -  ( 05 Jun 2021 02:53 )  WBC Count : 9.80 K/uL  Hemoglobin : 11.9 g/dL  Hematocrit : 36.4 %  Platelet Count - Automated : 199 K/uL  Mean Cell Volume : 84.3 fL  Mean Cell Hemoglobin : 27.5 pg  Mean Cell Hemoglobin Concentration : 32.7 gm/dL  Auto Neutrophil # : 7.07 K/uL  Auto Lymphocyte # : 1.29 K/uL  Auto Monocyte # : 0.79 K/uL  Auto Eosinophil # : 0.58 K/uL  Auto Basophil # : 0.04 K/uL  Auto Neutrophil % : 72.1 %  Auto Lymphocyte % : 13.2 %  Auto Monocyte % : 8.1 %  Auto Eosinophil % : 5.9 %  Auto Basophil % : 0.4 %    06-05    136  |  102  |  11  ----------------------------<  291<H>  4.2   |  22  |  1.42<H>    Ca    8.8      05 Jun 2021 02:53    TPro  7.3  /  Alb  4.0  /  TBili  0.3  /  DBili  x   /  AST  13  /  ALT  15  /  AlkPhos  90  06-05    proBNP: Serum Pro-Brain Natriuretic Peptide: 706 pg/mL (06-05 @ 02:53)    PREVIOUS DIAGNOSTIC TESTING:    < from: Cardiac Cath Lab - Adult (08.03.16 @ 17:11) >  CORONARY VESSELS: The coronary circulation is right dominant.  LM:   --  LM: Angiography showed minor luminal irregularities with no flow  limiting lesions.  LAD:   --  Proximal LAD: There was a tubular 90 % stenosis. There was MAGO  grade 3 flow through the vessel (brisk flow).  --  Mid LAD: There was a 100 % stenosis. There was MAGO grade 0 flow  through the vessel (no flow).  --  S1: Normal. The vessel was medium sized.  CX:   --  Circumflex: The vessel was small sized.  --  Proximal circumflex: There was a tubular 30 % stenosis.  --  Mid circumflex: There was a tubular 60 % stenosis. There was MAGO grade  3 flow through the vessel (brisk flow).  RI:   --  Proximal ramus intermedius: There was a 100 % stenosis.  --  Mid ramus intermedius: There was a discrete 30 % stenosis distal to the  graft anastomosis.  RCA:   --  Proximal RCA: The distal vessel was supplied by collaterals from  the circumflex. There was a 100 % stenosis. There was MAGO grade 0 flow  through the vessel (no flow).  GRAFTS:   --  Graft to the mid LAD: The graft was a LIMA. It was normal.  --  Graft to the ramus intermedius: The graft was a saphenous vein graft  from the aorta. It was normal. There was a discrete 30 % stenosis in the  proximal third of the graft.  --  Graft to the RPL1: The graft was a saphenous vein y-graft from the  aorta. It was normal.  --  Graft to the RPDA: The graft was a saphenous vein y-graft from the  aorta. It was normal.  AORTA: There was no evidence for dissection.  COMPLICATIONS: No complications occurred during the cath lab visit.  DIAGNOSTIC RECOMMENDATIONS: Patient management should include aggressive  medical therapy, close monitoring of BUN and creatinine, and smoking  cessation. Optimize anti-ischemic therapy.    < end of copied text >       HPI:  62yo male Moh[place] speaking and current smoker, w/ HTN, HLD, type II DM, CAD s/p CABG (2003), with recent cardiac cath on 6/2/21 w/ unsuccessful PCI to pLCx . Other findings: LM mild luminal irregularities, mLAD 100%, D1 95%, pLCx  w/ L-L collaterals, pRCA 100%, patent LIMA-LAD, patent free Rad-D1, SVG-RPDA, EDP 9. with Plan to return in 4-6 weeks for staged PCI of pLCX .  Presents w/ chest pain last night lasting 30 minutes, called EMS and CP resolved with nitro sublingual. CP described as sharp left sided. Started while patient went to bed. Denies any SOB, cough, fevers, dizziness, syncope, abd pain, back pain, N/V/D, recent sick contact, recent travel.   	   No Known Allergies      PAST MEDICAL & SURGICAL HISTORY:  CAD (coronary artery disease)  Hypertension  Diabetes  Hyperlipemia  S/P CABG (coronary artery bypass graft)  in 2003, [place]  Gastritis  S/P CABG (coronary artery bypass graft)  in 2003    FAMILY HISTORY:  FH: CAD (coronary artery disease) (Sibling)    SOCIAL HISTORY  Marital Status:   Occupation: unemployed  Lives with: family    SUBSTANCE USE  Tobacco Usage: active smoker however quit a few days ago    Alcohol Usage: occasional  Recreational drugs: denies    REVIEW OF SYSTEMS:  CONSTITUTIONAL: No fevers, No chills, No fatigue, No weight gain  RESPIRATORY: No shortness of breath, No cough, No wheezing, No hemoptysis  CARDIOVASCULAR: No chest pain. No palpitations, No pleuritic pain  GASTROINTESTINAL: No abdominal pain, No nausea, No vomiting, No hematemesis, No diarrhea No constipation. No melena  GENITOURINARY: No dysuria, No frequency, No incontinence, No hematuria  NEUROLOGICAL: No dizziness, No lightheadedness, No syncope, No LOC, No headache, No numbness or weakness  EXTREMITIES: No Edema, No joint pain, No joint swelling.  SKIN: No diaphoresis. No itching, No rashes, No pressure ulcers  All other review of systems is negative unless indicated above.    T(C): 37 (06-05-21 @ 05:10), Max: 37.3 (06-05-21 @ 01:32)  HR: 65 (06-05-21 @ 05:10) (65 - 78)  BP: 123/43 (06-05-21 @ 05:10) (114/63 - 130/59)  RR: 19 (06-05-21 @ 05:10) (16 - 19)  SpO2: 99% (06-05-21 @ 05:10) (99% - 100%)    Physical Exam:  General: NAD  Cardiovascular: Normal S1 S2, No JVD, No murmurs, No edema  Respiratory: Lungs clear to auscultation	  Gastrointestinal:  Soft, Non-tender, + BS	  Skin: warm and dry, No rashes, No ecchymoses, No cyanosis	  Extremities:  No clubbing, cyanosis or edema  Vascular: Peripheral pulses palpable 2+ bilaterally    CBC Full  -  ( 05 Jun 2021 02:53 )  WBC Count : 9.80 K/uL  Hemoglobin : 11.9 g/dL  Hematocrit : 36.4 %  Platelet Count - Automated : 199 K/uL  Mean Cell Volume : 84.3 fL  Mean Cell Hemoglobin : 27.5 pg  Mean Cell Hemoglobin Concentration : 32.7 gm/dL  Auto Neutrophil # : 7.07 K/uL  Auto Lymphocyte # : 1.29 K/uL  Auto Monocyte # : 0.79 K/uL  Auto Eosinophil # : 0.58 K/uL  Auto Basophil # : 0.04 K/uL  Auto Neutrophil % : 72.1 %  Auto Lymphocyte % : 13.2 %  Auto Monocyte % : 8.1 %  Auto Eosinophil % : 5.9 %  Auto Basophil % : 0.4 %    06-05    136  |  102  |  11  ----------------------------<  291<H>  4.2   |  22  |  1.42<H>    Ca    8.8      05 Jun 2021 02:53    TPro  7.3  /  Alb  4.0  /  TBili  0.3  /  DBili  x   /  AST  13  /  ALT  15  /  AlkPhos  90  06-05    proBNP: Serum Pro-Brain Natriuretic Peptide: 706 pg/mL (06-05 @ 02:53)    PREVIOUS DIAGNOSTIC TESTING:    < from: Cardiac Cath Lab - Adult (08.03.16 @ 17:11) >  CORONARY VESSELS: The coronary circulation is right dominant.  LM:   --  LM: Angiography showed minor luminal irregularities with no flow  limiting lesions.  LAD:   --  Proximal LAD: There was a tubular 90 % stenosis. There was MAGO  grade 3 flow through the vessel (brisk flow).  --  Mid LAD: There was a 100 % stenosis. There was MAGO grade 0 flow  through the vessel (no flow).  --  S1: Normal. The vessel was medium sized.  CX:   --  Circumflex: The vessel was small sized.  --  Proximal circumflex: There was a tubular 30 % stenosis.  --  Mid circumflex: There was a tubular 60 % stenosis. There was MAGO grade  3 flow through the vessel (brisk flow).  RI:   --  Proximal ramus intermedius: There was a 100 % stenosis.  --  Mid ramus intermedius: There was a discrete 30 % stenosis distal to the  graft anastomosis.  RCA:   --  Proximal RCA: The distal vessel was supplied by collaterals from  the circumflex. There was a 100 % stenosis. There was MAGO grade 0 flow  through the vessel (no flow).  GRAFTS:   --  Graft to the mid LAD: The graft was a LIMA. It was normal.  --  Graft to the ramus intermedius: The graft was a saphenous vein graft  from the aorta. It was normal. There was a discrete 30 % stenosis in the  proximal third of the graft.  --  Graft to the RPL1: The graft was a saphenous vein y-graft from the  aorta. It was normal.  --  Graft to the RPDA: The graft was a saphenous vein y-graft from the  aorta. It was normal.  AORTA: There was no evidence for dissection.  COMPLICATIONS: No complications occurred during the cath lab visit.  DIAGNOSTIC RECOMMENDATIONS: Patient management should include aggressive  medical therapy, close monitoring of BUN and creatinine, and smoking  cessation. Optimize anti-ischemic therapy.    < end of copied text >       HPI:  60yo male Yoruba speaking and current smoker, w/ HTN, HLD, type II DM, CAD s/p CABG (2003), with recent cardiac cath on 6/2/21 w/ unsuccessful PCI to pLCx . Other findings: LM mild luminal irregularities, mLAD 100%, D1 95%, pLCx  w/ L-L collaterals, pRCA 100%, patent LIMA-LAD, patent free Rad-D1, SVG-RPDA, EDP 9. with Plan to return in 4-6 weeks for staged PCI of pLCX .  Presents w/ chest pain last night lasting 30 minutes, called EMS and CP resolved with nitro sublingual. CP described as sharp left sided. Started while patient went to bed. Denies any SOB, cough, fevers, dizziness, syncope, abd pain, back pain, N/V/D, recent sick contact, recent travel.   	   No Known Allergies      PAST MEDICAL & SURGICAL HISTORY:  CAD (coronary artery disease)  Hypertension  Diabetes  Hyperlipemia  S/P CABG (coronary artery bypass graft)  in 2003, [place]  Gastritis  S/P CABG (coronary artery bypass graft)  in 2003    FAMILY HISTORY:  FH: CAD (coronary artery disease) (Sibling)    SOCIAL HISTORY  Marital Status:   Occupation: unemployed  Lives with: family    SUBSTANCE USE  Tobacco Usage: active smoker however quit a few days ago    Alcohol Usage: occasional  Recreational drugs: denies    REVIEW OF SYSTEMS:  CONSTITUTIONAL: No fevers, No chills, No fatigue, No weight gain  RESPIRATORY: No shortness of breath, No cough, No wheezing, No hemoptysis  CARDIOVASCULAR: No chest pain. No palpitations, No pleuritic pain  GASTROINTESTINAL: No abdominal pain, No nausea, No vomiting, No hematemesis, No diarrhea No constipation. No melena  GENITOURINARY: No dysuria, No frequency, No incontinence, No hematuria  NEUROLOGICAL: No dizziness, No lightheadedness, No syncope, No LOC, No headache, No numbness or weakness  EXTREMITIES: No Edema, No joint pain, No joint swelling.  SKIN: No diaphoresis. No itching, No rashes, No pressure ulcers  All other review of systems is negative unless indicated above.    T(C): 37 (06-05-21 @ 05:10), Max: 37.3 (06-05-21 @ 01:32)  HR: 65 (06-05-21 @ 05:10) (65 - 78)  BP: 123/43 (06-05-21 @ 05:10) (114/63 - 130/59)  RR: 19 (06-05-21 @ 05:10) (16 - 19)  SpO2: 99% (06-05-21 @ 05:10) (99% - 100%)    Physical Exam:  General: NAD  Cardiovascular: Normal S1 S2, No JVD, No murmurs, No edema  Respiratory: Lungs clear to auscultation	  Gastrointestinal:  Soft, Non-tender, + BS	  Skin: warm and dry, No rashes, No ecchymoses, No cyanosis	  Extremities:  No clubbing, cyanosis or edema  Vascular: Peripheral pulses palpable 2+ bilaterally    CBC Full  -  ( 05 Jun 2021 02:53 )  WBC Count : 9.80 K/uL  Hemoglobin : 11.9 g/dL  Hematocrit : 36.4 %  Platelet Count - Automated : 199 K/uL  Mean Cell Volume : 84.3 fL  Mean Cell Hemoglobin : 27.5 pg  Mean Cell Hemoglobin Concentration : 32.7 gm/dL  Auto Neutrophil # : 7.07 K/uL  Auto Lymphocyte # : 1.29 K/uL  Auto Monocyte # : 0.79 K/uL  Auto Eosinophil # : 0.58 K/uL  Auto Basophil # : 0.04 K/uL  Auto Neutrophil % : 72.1 %  Auto Lymphocyte % : 13.2 %  Auto Monocyte % : 8.1 %  Auto Eosinophil % : 5.9 %  Auto Basophil % : 0.4 %    06-05    136  |  102  |  11  ----------------------------<  291<H>  4.2   |  22  |  1.42<H>    Ca    8.8      05 Jun 2021 02:53    TPro  7.3  /  Alb  4.0  /  TBili  0.3  /  DBili  x   /  AST  13  /  ALT  15  /  AlkPhos  90  06-05    proBNP: Serum Pro-Brain Natriuretic Peptide: 706 pg/mL (06-05 @ 02:53)    PREVIOUS DIAGNOSTIC TESTING:    < from: Cardiac Cath Lab - Adult (08.03.16 @ 17:11) >  CORONARY VESSELS: The coronary circulation is right dominant.  LM:   --  LM: Angiography showed minor luminal irregularities with no flow  limiting lesions.  LAD:   --  Proximal LAD: There was a tubular 90 % stenosis. There was MAGO  grade 3 flow through the vessel (brisk flow).  --  Mid LAD: There was a 100 % stenosis. There was MAGO grade 0 flow  through the vessel (no flow).  --  S1: Normal. The vessel was medium sized.  CX:   --  Circumflex: The vessel was small sized.  --  Proximal circumflex: There was a tubular 30 % stenosis.  --  Mid circumflex: There was a tubular 60 % stenosis. There was MAGO grade  3 flow through the vessel (brisk flow).  RI:   --  Proximal ramus intermedius: There was a 100 % stenosis.  --  Mid ramus intermedius: There was a discrete 30 % stenosis distal to the  graft anastomosis.  RCA:   --  Proximal RCA: The distal vessel was supplied by collaterals from  the circumflex. There was a 100 % stenosis. There was MAGO grade 0 flow  through the vessel (no flow).  GRAFTS:   --  Graft to the mid LAD: The graft was a LIMA. It was normal.  --  Graft to the ramus intermedius: The graft was a saphenous vein graft  from the aorta. It was normal. There was a discrete 30 % stenosis in the  proximal third of the graft.  --  Graft to the RPL1: The graft was a saphenous vein y-graft from the  aorta. It was normal.  --  Graft to the RPDA: The graft was a saphenous vein y-graft from the  aorta. It was normal.  AORTA: There was no evidence for dissection.  COMPLICATIONS: No complications occurred during the cath lab visit.  DIAGNOSTIC RECOMMENDATIONS: Patient management should include aggressive  medical therapy, close monitoring of BUN and creatinine, and smoking  cessation. Optimize anti-ischemic therapy.    < end of copied text >

## 2021-06-05 NOTE — ED PROVIDER NOTE - PMH
CAD (coronary artery disease)    Diabetes    Gastritis    Hyperlipemia    Hypertension    S/P CABG (coronary artery bypass graft)  in [place]

## 2021-06-05 NOTE — H&P ADULT - PROBLEM SELECTOR PLAN 1
Admit to Telemetry, serial CE's, check FLP, HgbA1c, continue ASA, BB, statin. Cardiology c/s in chart- check Echo, planned for PCI per Cardio. Admit to Telemetry, serial CE's, check FLP, HgbA1c, Received Brilinta x1, continue ASA, BB, statin. Cardiology c/s in chart- check Echo, planned for PCI per Cardio.

## 2021-06-05 NOTE — ED PROVIDER NOTE - NS ED ROS FT
GENERAL: No fever or chills, EYES: no change in vision, HEENT: no trouble speaking, CARDIAC: + chest pain, palpitation PULMONARY: no cough or +SOB, GI: no abdominal pain, no nausea, no vomiting, no diarrhea or constipation, : No changes in urination, SKIN: no rashes, NEURO: no headache,  MSK: + arm pain ~Tamara Osborn MD

## 2021-06-05 NOTE — CONSULT NOTE ADULT - ASSESSMENT
60yo male Polish speaking and current smoker, w/ HTN, HLD, type II DM, CAD s/p CABG (2003), with recent cardiac cath on 6/2/21 w/ unsuccessful PCI to pLCx . Other findings: LM mild luminal irregularities, mLAD 100%, D1 95%, pLCx  w/ L-L collaterals, pRCA 100%, patent LIMA-LAD, patent free Rad-D1, SVG-RPDA, EDP 9. with Plan to return in 4-6 weeks for staged PCI of pLCX .  Presents w/ chest pain last night lasting 30 minutes, called EMS and CP resolved with nitro sublingual. CP described as sharp left sided. Started while patient went to bed. Denies any SOB, cough, fevers, dizziness, syncope, abd pain, back pain, N/V/D, recent sick contact, recent travel.     Cardiologist Dr Albrecht    # Chest pain  Patient currently chest pain free at present.   Continuous cardiac monitoring to monitor for arrhythmias  CBC, CMP, coags, HbA1C, TSH, lipids for comorbidities, Trend cardiac enzymes   Continue home meds  Low fat DASH diet  ECHO: TTE in am    Thank you, if any questions or clinical situation changes please call [facility] #[phone].  Attending Attestation to follow 62yo male Romansh speaking and current smoker, w/ HTN, HLD, type II DM, CAD s/p CABG (2003), with recent cardiac cath on 6/2/21 w/ unsuccessful PCI to pLCx . Other findings: LM mild luminal irregularities, mLAD 100%, D1 95%, pLCx  w/ L-L collaterals, pRCA 100%, patent LIMA-LAD, patent free Rad-D1, SVG-RPDA, EDP 9. with Plan to return in 4-6 weeks for staged PCI of pLCX .  Presents w/ chest pain last night lasting 30 minutes, called EMS and CP resolved with nitro sublingual. CP described as sharp left sided. Started while patient went to bed. Denies any SOB, cough, fevers, dizziness, syncope, abd pain, back pain, N/V/D, recent sick contact, recent travel.     Cardiologist Dr Albrecht    # Chest pain  Patient currently chest pain free at present.   Continuous cardiac monitoring to monitor for arrhythmias  CBC, CMP, coags, HbA1C, TSH, lipids for comorbidities, Trend cardiac enzymes   Continue home meds  Low fat DASH diet  ECHO: TTE in am  Planned for PCI collaborate with cardiologist and interventionalist     Thank you, if any questions or clinical situation changes please call [facility] [facility] #[phone].  Attending Attestation to follow

## 2021-06-05 NOTE — ED PROVIDER NOTE - ATTENDING CONTRIBUTION TO CARE
HPI: 60 yo M Kyrgyz speaking (Interp# [id number]) Past Medical History  HTN, HLD, type II DM, CAD s/p CABG (in 2003) s/p angiogram 3 days ago p/w Left side CP x 2 days ago. CP intermittent radiating left, worse with inspiration not associated , position, exertion or food consumption.  Pt report shortness of breath but denies nausea, vomiting,  weakness, fatigue, lightheadedness.  Pt has had no recent long trips, surgery, trauma, denies hemoptysis, and has no hx of DVT/PE. .  Pt also denies fevers, chills cough. Pt was at [facility] the other day per chart and states that they saw a blockage on the angiogram but was not able to stent the blockage. Pt states that he has an appt with CT surg this sunday but is pain was so severe that he felt that he was going to die.  EXAM: NAD, skin not diaphoretic, heart RRR, lungs ctab, abd soft nontender, pulses palpable x 4.   MDM: pt with multiple medical problems that was recently at [facility] and had a cath but unable to place stent due to significance of occlusion that is supposed to f/u with CT surgery presents with chest pain similar to his previous chest pain. Took NGT by EMS and placed his own patch, now pain is minimal. EKG in triage shows similar EKG from 2 days ago which is concerning for LAD occlusion which is already known on Cath. Not likely a STEMI code candidate but will emergently consult Cards/CCU. likely admission as well.

## 2021-06-05 NOTE — ED ADULT TRIAGE NOTE - CHIEF COMPLAINT QUOTE
Pt c/o left sided chest pain that radiated to left arm  since midnight. Pt has a cath done 2 days unknown what came from it. pt noted to be hypertensive by EMS, Pt received 1 nitro and 4 baby aspirin by EMS. Pt received with 20g to top of left hand with no redness or swelling noted. No complaints of  headache, nausea, dizziness, vomiting  SOB, fever, chills verbalized.. Pt c/o left sided chest pain that radiated to left arm  since midnight. Pt has a cath done 2 days unknown what came from it. pt noted to be hypertensive by EMS, Pt received 1 nitro and 4 baby aspirin by EMS. Pt received with 20g to top of left hand with no redness or swelling noted. No complaints of  headache, nausea, dizziness, vomiting  SOB, fever, chills verbalized..    pt to be seen sooner as per Dr. real

## 2021-06-05 NOTE — H&P ADULT - NEGATIVE ENMT SYMPTOMS
no hearing difficulty/no ear pain/no tinnitus/no vertigo/no sinus symptoms/no nasal congestion/no nasal discharge/no nose bleeds/no throat pain/no dysphagia

## 2021-06-05 NOTE — CONSULT NOTE ADULT - ATTENDING COMMENTS
Patient seen/examined during morning rounds.  Assessment and recommendations reviewed with CCU NP, and as outlined above. Patient seen/examined during morning rounds.  Assessment and recommendations reviewed with CCU NP, and as outlined above.  Will need to contact Dr. Albrecht.  Possible LHC at [facility] on Monday.

## 2021-06-05 NOTE — PATIENT PROFILE ADULT - NSPROPOAURINARYCATHETER_GEN_A_NUR
20NICU Progress Note  This is a term male infant born 2016  2:41 PM at Gestational Age: 39w1d now at age: 5 Wks    Patient Active Problem List    Diagnosis Date Noted   • Normal  (single liveborn) 2016     Priority: Medium   •  abstinence syndrome 2018     Priority: Low   • Maternal drug dependence, antepartum (CMS/HCC) 2018     Priority: Low     Methadone, heroin, cocaine       Interim:  No acute overnight events, had circumcision performed yesterday. Remains on morphine, clonidine and split phenobarbital. Last weaned on . . Tolerating scores up to 11, now that he is >21 days old. Tolerating feeds.      Abstinence Score:   Last 24 hours: Narcotic Abstinence Screening Score  Av.9  Min: 6   Min taken time: 07/15/18 1330  Max: 15   Max taken time: 07/15/18 1700  Last 48 hours: Narcotic Abstinence Screening Score  Av.3  Min: 6   Min taken time: 07/15/18 1330  Max: 15   Max taken time: 07/15/18 1700  Patient Vitals for the past 16 hrs:   Narcotic Abstinence Screening Score   07/15/18 2030 9   07/15/18 2330 7   18 0230 8   18 0530 8   18 0830 11     Objective:  Vital Last Value 24 Hour Range   Temperature 99 °F (37.2 °C) (18 0830) Temp  Min: 98.4 °F (36.9 °C)  Max: 99.3 °F (37.4 °C)   Pulse (!) 170 (18 0830) Pulse  Min: 148  Max: 170   Respiratory 66 (18 0830) Resp  Min: 45  Max: 68   Non-Invasive Blood Pressure 93/56 (18 0830) BP  Min: 79/33  Max: 93/56   Mean Arterial Pressure 68 (18 0830) MAP (mmHg)  Min: 48  Max: 68     Vital Today Admitted   Weight 3795 g (07/15/18 2030) Weight: 2924 g (Filed from Delivery Summary) (06/10/18 1441)     Weight over the past 48 Hours:   Patient Vitals for the past 48 hrs:   Weight   07/15/18 0000 3735 g   07/15/18 2030 3795 g   Weight change: 60 g     RA    Last Apnea:   and intervention:  None recorded    Physical Exam:  Birthweight: 6 lb 7.1 oz (2924 g) with Weight change: 60 g  30% since birth  Gen: Awakens with exam, pleasant.  In no acute distress. In open crib.  Skin: Pink, well perfused, warm.  Non edematous, no jaundice.   HEENT: Anterior fontanelle is open, soft and flat. Eyes are open and without discharge.   CV: Normal rate and rhythm, no murmur. Brisk capillary refill.  Pulm: Equal, clear breath sounds bilaterally.  No retractions noted.  Abd: Soft, non-tender, non-distended.  Bowel sounds are active.    Male, testes down x2. Circ site without oozing or bleeding.   Ext: Equal, spontaneous movement of all extremities.   Neuro:  Increased tone, no tremors. + suck.        Fluids:    Date 07/15/18 0700 - 18 0659 18 07 - 18 0659   Shift 4389-8493 1250-3121 4420-3661 24 Hour Total 8593-1964 4799-4764 4932-2141 24 Hour Total   I  N  T  A  K  E   P.O.  (mL/kg) 180  (48.19) 185  (48.75) 245  (64.56) 610  (160.74) 75  (19.76)   75  (19.76)    Shift Total  (mL/kg) 180  (48.19) 185  (48.75) 245  (64.56) 610  (160.74) 75  (19.76)   75  (19.76)   O  U  T  P  U  T   Shift Total           Weight (kg) 3.74 3.79 3.79 3.79 3.79 3.79 3.79 3.79      Po ad marnie feedings Similac Sensitive 160ml/kg/day  Void x 8  Stool x 2    Labs:   No results found for this or any previous visit (from the past 24 hour(s)).    Medications:  Current Facility-Administered Medications   Medication Dose Route Frequency Provider Last Rate Last Dose   • acetaminophen (TYLENOL) suspension 37.44 mg  10 mg/kg Oral Q6H PRN Rena Aden MD   37.44 mg at 18 0549   • morphine (1:5)  oral solution 0.14 mg  0.14 mg Oral Q2H PRN Quique Sandoval MD   0.14 mg at 18 0830   • nystatin (MYCOSTATIN)  oral suspension 100,000 Units  100,000 Units Oral Q6H Malorie Marshall MD   100,000 Units at 18 0730   • PHENobarbital  oral elixir 9.2 mg  2.5 mg/kg Oral Q12H Terence Dial DO   9.2 mg at 18 0224   • cloNIDine (compounded)  oral suspension 7.2 mcg  2 mcg/kg Oral Q6H  no Terence Dial, DO   7.2 mcg at 07/16/18 0830   • pediatric multivitamin with iron (POLY-VI-SOL WITH IRON) oral solution 1 mL  1 mL Oral Q24H Terence Dial, DO   1 mL at 07/15/18 1325   • hepatitis B (ENGERIX-B) 10 MCG/0.5ML vaccine 10 mcg  0.5 mL Intramuscular Once James Winkler MD         Impression: Term male infant at 39 1/7 weeks, now corrected to 44w 2d - ANISH - methadone, as well as cocaine, heroin.  Also maternal HepC.  Mom smokes 1 ppd cigarettes.     Plan:  N:  Peak dose of morphine was 0.3mg (should wean in increments of 0.03mg for future weans if possible). Weaned morphine last on 7/12. Continue clonidine and phenobarbital (last weight adjusted 7/11). Follow ANISH scoring, ok to be higher as now over 1 month of age. Same morphine today given elevated scores of 11 x2 this AM.  Resp: Follow respiratory status in room air.  CV: Follow HR/BP and perfusion.  FEN: PO ad marnie - follow stooling pattern and weight gain.  MVI with iron.   ID: Will need output HepC testing.     Mom updated over the phone 7/16    I saw and examined Boy Lala Angeles on 2018 at 9:57 AM, and patient requires: NICU Intensive Care: Continuous monitoring of VS

## 2021-06-05 NOTE — H&P ADULT - NSICDXPASTMEDICALHX_GEN_ALL_CORE_FT
PAST MEDICAL HISTORY:  CAD (coronary artery disease)     Chronic kidney disease (CKD) told his creat was elevated in 2020, unsure of levels    Diabetes     Gastritis     Hyperlipemia     Hypertension

## 2021-06-05 NOTE — ED PROVIDER NOTE - CLINICAL SUMMARY MEDICAL DECISION MAKING FREE TEXT BOX
60 yo M Wardli speaking [id number] PMH HTN, HLD, type II DM, CAD s/p CABG (in 2003) s/p angiogram 3 days ago p/w Left side CP x 2 days ago. CP intermittent radiating left, worse with inspiration not associated , position, exertion or food consumption.  Pt report shortness of breath. EKG showing ST elevation in AVR with depression in I, AVL and twave inversion in anterior lateral leads unchanged from 3 days ago. will get cbc, cmp, trop, pt ptt, ckmb, cxr covid pcr, ASA, brilinta heparin gtt, Cards consult

## 2021-06-05 NOTE — H&P ADULT - ASSESSMENT
62 yo French speaking male s/p unsuccessful PCI to pLCx on 6/2/21 in [facility], CAD s/p CABG in 03, HTN, HLD, DM2, carotid bruit, CKD p/w episode of exertional CP last night improved with sl NTG.  EKG- NSR @ 70 old TWI v2, v4-6, I, L   CXR (prelim) no emergent findings

## 2021-06-05 NOTE — ED PROVIDER NOTE - OBJECTIVE STATEMENT
Jim speaking [id number] [facility] s/p angiogram 2 days ago p/w Left side CP x 2 days ago. CP intermittent radiating left, worse with inspiration not associated , position, exertion or food consumption.  Pt report shortness of breath but denies nausea, vomiting,  weakness, fatigue, lightheadedness.  Pt has had no recent long trips, surgery, trauma, denies hemoptysis, and has no hx of DVT/PE. .  Pt also denies fevers, chills cough. Pt states that they saw a blockage on the angiogram but was not able to stent the blockage. Pt states that he has an appt with CT surg this sunday but is pain was so servere that he felt that he was going to die.    PCP Dr. Ulloa   Cards  60 yo M Wardli speaking [id number] PMH HTN, HLD, type II DM, CAD s/p CABG (in 2003) s/p angiogram 3 days ago p/w Left side CP x 2 days ago. CP intermittent radiating left, worse with inspiration not associated , position, exertion or food consumption.  Pt report shortness of breath but denies nausea, vomiting,  weakness, fatigue, lightheadedness.  Pt has had no recent long trips, surgery, trauma, denies hemoptysis, and has no hx of DVT/PE. .  Pt also denies fevers, chills cough. Pt states that they saw a blockage on the angiogram but was not able to stent the blockage. Pt states that he has an appt with CT surg this sunday but is pain was so servere that he felt that he was going to die.    PCP Dr. Ulloa   Cards

## 2021-06-05 NOTE — ED ADULT NURSE NOTE - CHIEF COMPLAINT QUOTE
Pt c/o left sided chest pain that radiated to left arm  since midnight. Pt has a cath done 2 days unknown what came from it. pt noted to be hypertensive by EMS, Pt received 1 nitro and 4 baby aspirin by EMS. Pt received with 20g to top of left hand with no redness or swelling noted. No complaints of  headache, nausea, dizziness, vomiting  SOB, fever, chills verbalized..    pt to be seen sooner as per Dr. rael

## 2021-06-06 LAB
ALBUMIN SERPL ELPH-MCNC: 3.8 G/DL — SIGNIFICANT CHANGE UP (ref 3.3–5)
ALP SERPL-CCNC: 97 U/L — SIGNIFICANT CHANGE UP (ref 40–120)
ALT FLD-CCNC: 12 U/L — SIGNIFICANT CHANGE UP (ref 4–41)
ANION GAP SERPL CALC-SCNC: 15 MMOL/L — HIGH (ref 7–14)
APPEARANCE UR: CLEAR — SIGNIFICANT CHANGE UP
AST SERPL-CCNC: 11 U/L — SIGNIFICANT CHANGE UP (ref 4–40)
BASOPHILS # BLD AUTO: 0.05 K/UL — SIGNIFICANT CHANGE UP (ref 0–0.2)
BASOPHILS NFR BLD AUTO: 0.5 % — SIGNIFICANT CHANGE UP (ref 0–2)
BILIRUB SERPL-MCNC: 0.7 MG/DL — SIGNIFICANT CHANGE UP (ref 0.2–1.2)
BILIRUB UR-MCNC: NEGATIVE — SIGNIFICANT CHANGE UP
BUN SERPL-MCNC: 14 MG/DL — SIGNIFICANT CHANGE UP (ref 7–23)
CALCIUM SERPL-MCNC: 9.2 MG/DL — SIGNIFICANT CHANGE UP (ref 8.4–10.5)
CHLORIDE SERPL-SCNC: 101 MMOL/L — SIGNIFICANT CHANGE UP (ref 98–107)
CO2 SERPL-SCNC: 19 MMOL/L — LOW (ref 22–31)
COLOR SPEC: SIGNIFICANT CHANGE UP
COVID-19 SPIKE DOMAIN AB INTERP: POSITIVE
COVID-19 SPIKE DOMAIN ANTIBODY RESULT: >250 U/ML — HIGH
CREAT SERPL-MCNC: 1.48 MG/DL — HIGH (ref 0.5–1.3)
DIFF PNL FLD: NEGATIVE — SIGNIFICANT CHANGE UP
EOSINOPHIL # BLD AUTO: 0.42 K/UL — SIGNIFICANT CHANGE UP (ref 0–0.5)
EOSINOPHIL NFR BLD AUTO: 4.4 % — SIGNIFICANT CHANGE UP (ref 0–6)
GLUCOSE SERPL-MCNC: 159 MG/DL — HIGH (ref 70–99)
GLUCOSE UR QL: NEGATIVE — SIGNIFICANT CHANGE UP
HCT VFR BLD CALC: 40.8 % — SIGNIFICANT CHANGE UP (ref 39–50)
HCV AB S/CO SERPL IA: 0.16 S/CO — SIGNIFICANT CHANGE UP (ref 0–0.99)
HCV AB SERPL-IMP: SIGNIFICANT CHANGE UP
HGB BLD-MCNC: 13.2 G/DL — SIGNIFICANT CHANGE UP (ref 13–17)
IANC: 6.75 K/UL — SIGNIFICANT CHANGE UP (ref 1.5–8.5)
IMM GRANULOCYTES NFR BLD AUTO: 0.5 % — SIGNIFICANT CHANGE UP (ref 0–1.5)
KETONES UR-MCNC: NEGATIVE — SIGNIFICANT CHANGE UP
LEUKOCYTE ESTERASE UR-ACNC: NEGATIVE — SIGNIFICANT CHANGE UP
LYMPHOCYTES # BLD AUTO: 1.59 K/UL — SIGNIFICANT CHANGE UP (ref 1–3.3)
LYMPHOCYTES # BLD AUTO: 16.7 % — SIGNIFICANT CHANGE UP (ref 13–44)
MAGNESIUM SERPL-MCNC: 2.3 MG/DL — SIGNIFICANT CHANGE UP (ref 1.6–2.6)
MCHC RBC-ENTMCNC: 26.9 PG — LOW (ref 27–34)
MCHC RBC-ENTMCNC: 32.4 GM/DL — SIGNIFICANT CHANGE UP (ref 32–36)
MCV RBC AUTO: 83.1 FL — SIGNIFICANT CHANGE UP (ref 80–100)
MONOCYTES # BLD AUTO: 0.68 K/UL — SIGNIFICANT CHANGE UP (ref 0–0.9)
MONOCYTES NFR BLD AUTO: 7.1 % — SIGNIFICANT CHANGE UP (ref 2–14)
NEUTROPHILS # BLD AUTO: 6.75 K/UL — SIGNIFICANT CHANGE UP (ref 1.8–7.4)
NEUTROPHILS NFR BLD AUTO: 70.8 % — SIGNIFICANT CHANGE UP (ref 43–77)
NITRITE UR-MCNC: NEGATIVE — SIGNIFICANT CHANGE UP
NRBC # BLD: 0 /100 WBCS — SIGNIFICANT CHANGE UP
NRBC # FLD: 0 K/UL — SIGNIFICANT CHANGE UP
PH UR: 6.5 — SIGNIFICANT CHANGE UP (ref 5–8)
PHOSPHATE SERPL-MCNC: 3.4 MG/DL — SIGNIFICANT CHANGE UP (ref 2.5–4.5)
PLATELET # BLD AUTO: 243 K/UL — SIGNIFICANT CHANGE UP (ref 150–400)
POTASSIUM SERPL-MCNC: 4.7 MMOL/L — SIGNIFICANT CHANGE UP (ref 3.5–5.3)
POTASSIUM SERPL-SCNC: 4.7 MMOL/L — SIGNIFICANT CHANGE UP (ref 3.5–5.3)
PROT SERPL-MCNC: 7.4 G/DL — SIGNIFICANT CHANGE UP (ref 6–8.3)
PROT UR-MCNC: NEGATIVE — SIGNIFICANT CHANGE UP
RBC # BLD: 4.91 M/UL — SIGNIFICANT CHANGE UP (ref 4.2–5.8)
RBC # FLD: 15.1 % — HIGH (ref 10.3–14.5)
SARS-COV-2 IGG+IGM SERPL QL IA: >250 U/ML — HIGH
SARS-COV-2 IGG+IGM SERPL QL IA: POSITIVE
SODIUM SERPL-SCNC: 135 MMOL/L — SIGNIFICANT CHANGE UP (ref 135–145)
SP GR SPEC: 1.01 — LOW (ref 1.01–1.02)
TSH SERPL-MCNC: 1.21 UIU/ML — SIGNIFICANT CHANGE UP (ref 0.27–4.2)
UROBILINOGEN FLD QL: SIGNIFICANT CHANGE UP
WBC # BLD: 9.54 K/UL — SIGNIFICANT CHANGE UP (ref 3.8–10.5)
WBC # FLD AUTO: 9.54 K/UL — SIGNIFICANT CHANGE UP (ref 3.8–10.5)

## 2021-06-06 PROCEDURE — 99233 SBSQ HOSP IP/OBS HIGH 50: CPT

## 2021-06-06 RX ORDER — TICAGRELOR 90 MG/1
90 TABLET ORAL EVERY 12 HOURS
Refills: 0 | Status: DISCONTINUED | OUTPATIENT
Start: 2021-06-06 | End: 2021-06-06

## 2021-06-06 RX ORDER — METOPROLOL TARTRATE 50 MG
75 TABLET ORAL DAILY
Refills: 0 | Status: DISCONTINUED | OUTPATIENT
Start: 2021-06-06 | End: 2021-06-09

## 2021-06-06 RX ADMIN — Medication 1 DROP(S): at 13:27

## 2021-06-06 RX ADMIN — Medication 50 MILLIGRAM(S): at 05:44

## 2021-06-06 RX ADMIN — Medication 1: at 13:26

## 2021-06-06 RX ADMIN — AMLODIPINE BESYLATE 10 MILLIGRAM(S): 2.5 TABLET ORAL at 05:45

## 2021-06-06 RX ADMIN — ATORVASTATIN CALCIUM 80 MILLIGRAM(S): 80 TABLET, FILM COATED ORAL at 22:34

## 2021-06-06 RX ADMIN — Medication 81 MILLIGRAM(S): at 13:28

## 2021-06-06 RX ADMIN — VALSARTAN 80 MILLIGRAM(S): 80 TABLET ORAL at 05:44

## 2021-06-06 RX ADMIN — Medication 1 PATCH: at 13:25

## 2021-06-06 RX ADMIN — Medication 1 DROP(S): at 17:47

## 2021-06-06 RX ADMIN — Medication 1 DROP(S): at 05:45

## 2021-06-06 RX ADMIN — ENOXAPARIN SODIUM 40 MILLIGRAM(S): 100 INJECTION SUBCUTANEOUS at 13:27

## 2021-06-06 RX ADMIN — Medication 1 PATCH: at 19:49

## 2021-06-06 RX ADMIN — RANOLAZINE 500 MILLIGRAM(S): 500 TABLET, FILM COATED, EXTENDED RELEASE ORAL at 17:46

## 2021-06-06 RX ADMIN — Medication 1 PATCH: at 08:41

## 2021-06-06 RX ADMIN — Medication 1: at 08:53

## 2021-06-06 RX ADMIN — Medication 1 PATCH: at 13:28

## 2021-06-06 RX ADMIN — RANOLAZINE 500 MILLIGRAM(S): 500 TABLET, FILM COATED, EXTENDED RELEASE ORAL at 05:44

## 2021-06-06 NOTE — PROGRESS NOTE ADULT - ASSESSMENT
61 M complex CAD, current smoker, w/ HTN, HLD, type II DM, CAD s/p CABG (2003), with recent cardiac cath on 6/2/21 w/ unsuccessful PCI to [facility] , presented for evaluation of angina. Troponin remains negative.     Plan:   Continue aspirin, atorvastatin 80  Has BP room to increase anti-anginal medications: Increase  Metoprolol XL 70 mg daily (if he has gotten his 50 mg AM dose already, just give additional XL 25 mg).   Continue Ranolazine   Continue amlodipine and valsartan for HTN  Contact Dr. Albrecht for possible LHC at [facility] on Monday.  61 M complex CAD, current smoker, w/ HTN, HLD, type II DM, CAD s/p CABG (2003), with recent cardiac cath on 6/2/21 w/ unsuccessful PCI to [facility] , presented for evaluation of angina. Troponin remains negative.     Plan:   Continue aspirin, atorvastatin 80  Has BP room to increase anti-anginal medications: Increase Metoprolol XL 70 mg daily (if he has gotten his 50 mg AM dose already, just give additional XL 25 mg.   Continue Ranolazine   Continue amlodipine and valsartan for HTN  Contact Dr. Albrecht for possible LHC at [facility] on Monday.  61 M complex CAD, current smoker, w/ HTN, HLD, type II DM, CAD s/p CABG (2003), with recent cardiac cath on 6/2/21 w/ unsuccessful PCI to [facility] , presented for evaluation of angina. Troponin remains negative. Patient has had no further episodes of CP since admission.     Plan:   Continue aspirin, atorvastatin 80  Has BP room to increase anti-anginal medications: Consider increasing Metoprolol XL 70 mg daily (if he has gotten his 50 mg AM dose already, just give additional XL 25 mg.   Continue Ranolazine   Continue amlodipine and valsartan for HTN  Contact Dr. Albrecht for possible LHC at [facility] on Monday.

## 2021-06-06 NOTE — PROGRESS NOTE ADULT - ATTENDING COMMENTS
Patient seen/examined during morning rounds.  Assessment and recommendations reviewed with Fellow and Telemetry PA, and as outlined above.  Probable plan for LHC tomorrow after review with Interventional Cardiology.

## 2021-06-06 NOTE — PROGRESS NOTE ADULT - ASSESSMENT
60 yo Estonian speaking male s/p unsuccessful PCI to pLCx on 6/2/21 in [facility], CAD s/p CABG in 03, HTN, HLD, DM2, carotid bruit, CKD p/w episode of exertional CP last night improved with sl NTG.

## 2021-06-06 NOTE — PROGRESS NOTE ADULT - SUBJECTIVE AND OBJECTIVE BOX
For all Cardiology service contact information, go to [email or web address] and use "[email or web address]" to login.    SUBJECTIVE:   No events overnight. Denies CP, SOB or Dyspnea.   -------------------------------------------------------------------------------------------  ROS:  CV: chest pain (-), palpitation (-), orthopnea (-), PND (-), edema (-)  PULM: SOB (-), cough (-), wheezing (-), hemoptysis (-).   CONST: fever (-), chills (-) or fatigue (-)  GI: abdominal distension (-), abdominal pain (-) , nausea/vomiting (-), hematemesis, (-), melena (-), hematochezia (-)  : dysuria (-), frequency (-), hematuria (-).   NEURO: numbness (-), weakness (-), dizziness (-)  SKIN: itching (-), rash (-)  HEENT:  visual changes (-); vertigo or throat pain (-);  neck stiffness (-)     All other review of systems is negative unless indicated above.   -------------------------------------------------------------------------------------------  VS:  T(F): 99.4 (), Max: 99.4 ()  HR: 82 () (64 - 82)  BP: 141/70 () (138/51 - 150/65)  RR: 18 ()  SpO2: 98% ()  I&O's Summary    ------------------------------------------------------------------------------------------  PHYSICAL EXAM:  GENERAL: NAD  HEAD:  Atraumatic, Normocephalic.  EYES: EOMI, PERRLA, conjunctiva and sclera clear.  ENT: Moist mucous membranes.  NECK: Supple, No JVD.  CHEST/LUNG: Clear to auscultation bilaterally; No rales, rhonchi, wheezing, or rubs. Unlabored respirations.  HEART: Regular rate and rhythm; No murmurs, rubs, or gallops.  ABDOMEN: Bowel sounds present; Soft, Nontender, Nondistended.   EXTREMITIES:  2+ Peripheral Pulses, brisk capillary refill. No clubbing, cyanosis, or edema.  PSYCH: Normal affect.  SKIN: No rashes or lesions.  -------------------------------------------------------------------------------------------  LABS:                          13.2   9.54  )-----------( 243      ( 2021 06:58 )             40.8     06-    135  |  101  |  14  ----------------------------<  159<H>  4.7   |  19<L>  |  1.48<H>    Ca    9.2      2021 06:58  Phos  3.4     06-  Mg     2.3     -    TPro  7.4  /  Alb  3.8  /  TBili  0.7  /  DBili  x   /  AST  11  /  ALT  12  /  AlkPhos  97  06-06    PT/INR - ( 2021 02:53 )   PT: 10.9 sec;   INR: 0.95 ratio         PTT - ( 2021 02:53 )  PTT:31.6 sec  CARDIAC MARKERS ( 2021 09:56 )  34 ng/L / x     / x     / 70 U/L / x     / 2.0 ng/mL  CARDIAC MARKERS ( 2021 02:53 )  33 ng/L / x     / x     / x     / x     / 1.8 ng/mL  CARDIAC MARKERS ( 2021 09:58 )  x     / x     / x     / 91 U/L / x     / 2.7 ng/mL      Total Cholesterol: 132  LDL: --  HDL: 49  T        -------------------------------------------------------------------------------------------  Meds:  amLODIPine   Tablet 10 milliGRAM(s) Oral daily  artificial  tears Solution 1 Drop(s) Both EYES four times a day  aspirin enteric coated 81 milliGRAM(s) Oral daily  atorvastatin 80 milliGRAM(s) Oral at bedtime  dextrose 40% Gel 15 Gram(s) Oral once  dextrose 5%. 1000 milliLiter(s) IV Continuous <Continuous>  dextrose 5%. 1000 milliLiter(s) IV Continuous <Continuous>  dextrose 50% Injectable 25 Gram(s) IV Push once  dextrose 50% Injectable 12.5 Gram(s) IV Push once  dextrose 50% Injectable 25 Gram(s) IV Push once  enoxaparin Injectable 40 milliGRAM(s) SubCutaneous daily  glucagon  Injectable 1 milliGRAM(s) IntraMuscular once  insulin lispro (ADMELOG) corrective regimen sliding scale   SubCutaneous three times a day before meals  metoprolol succinate ER 50 milliGRAM(s) Oral daily  nicotine - 21 mG/24Hr(s) Patch 1 patch Transdermal daily  ranolazine 500 milliGRAM(s) Oral two times a day  valsartan 80 milliGRAM(s) Oral daily    -------------------------------------------------------------------------------------------     For all Cardiology service contact information, go to [email or web address] and use "[email or web address]" to login.    SUBJECTIVE:   No events overnight. Denies CP, SOB or Dyspnea.   Tele: NSR no ectopy  -------------------------------------------------------------------------------------------  ROS:  CV: chest pain (-), palpitation (-), orthopnea (-), PND (-), edema (-)  PULM: SOB (-), cough (-), wheezing (-), hemoptysis (-).   CONST: fever (-), chills (-) or fatigue (-)  GI: abdominal distension (-), abdominal pain (-) , nausea/vomiting (-), hematemesis, (-), melena (-), hematochezia (-)  : dysuria (-), frequency (-), hematuria (-).   NEURO: numbness (-), weakness (-), dizziness (-)  SKIN: itching (-), rash (-)  HEENT:  visual changes (-); vertigo or throat pain (-);  neck stiffness (-)     All other review of systems is negative unless indicated above.   -------------------------------------------------------------------------------------------  VS:  T(F): 99.4 (), Max: 99.4 ()  HR: 82 () (64 - 82)  BP: 141/70 () (138/51 - 150/65)  RR: 18 ()  SpO2: 98% ()  I&O's Summary    ------------------------------------------------------------------------------------------  PHYSICAL EXAM:  GENERAL: NAD  HEAD:  Atraumatic, Normocephalic.  EYES: EOMI, PERRLA, conjunctiva and sclera clear.  ENT: Moist mucous membranes.  NECK: Supple, No JVD.  CHEST/LUNG: Clear to auscultation bilaterally; No rales, rhonchi, wheezing, or rubs. Unlabored respirations.  HEART: Regular rate and rhythm; No murmurs, rubs, or gallops.  ABDOMEN: Bowel sounds present; Soft, Nontender, Nondistended.   EXTREMITIES:  2+ Peripheral Pulses, brisk capillary refill. No clubbing, cyanosis, or edema.  PSYCH: Normal affect.  SKIN: No rashes or lesions.  -------------------------------------------------------------------------------------------  LABS:                          13.2   9.54  )-----------( 243      ( 2021 06:58 )             40.8     06-    135  |  101  |  14  ----------------------------<  159<H>  4.7   |  19<L>  |  1.48<H>    Ca    9.2      2021 06:58  Phos  3.4     06-  Mg     2.3     -    TPro  7.4  /  Alb  3.8  /  TBili  0.7  /  DBili  x   /  AST  11  /  ALT  12  /  AlkPhos  97  06-06    PT/INR - ( 2021 02:53 )   PT: 10.9 sec;   INR: 0.95 ratio         PTT - ( 2021 02:53 )  PTT:31.6 sec  CARDIAC MARKERS ( 2021 09:56 )  34 ng/L / x     / x     / 70 U/L / x     / 2.0 ng/mL  CARDIAC MARKERS ( 2021 02:53 )  33 ng/L / x     / x     / x     / x     / 1.8 ng/mL  CARDIAC MARKERS ( 2021 09:58 )  x     / x     / x     / 91 U/L / x     / 2.7 ng/mL      Total Cholesterol: 132  LDL: --  HDL: 49  T        -------------------------------------------------------------------------------------------  Meds:  amLODIPine   Tablet 10 milliGRAM(s) Oral daily  artificial  tears Solution 1 Drop(s) Both EYES four times a day  aspirin enteric coated 81 milliGRAM(s) Oral daily  atorvastatin 80 milliGRAM(s) Oral at bedtime  dextrose 40% Gel 15 Gram(s) Oral once  dextrose 5%. 1000 milliLiter(s) IV Continuous <Continuous>  dextrose 5%. 1000 milliLiter(s) IV Continuous <Continuous>  dextrose 50% Injectable 25 Gram(s) IV Push once  dextrose 50% Injectable 12.5 Gram(s) IV Push once  dextrose 50% Injectable 25 Gram(s) IV Push once  enoxaparin Injectable 40 milliGRAM(s) SubCutaneous daily  glucagon  Injectable 1 milliGRAM(s) IntraMuscular once  insulin lispro (ADMELOG) corrective regimen sliding scale   SubCutaneous three times a day before meals  metoprolol succinate ER 50 milliGRAM(s) Oral daily  nicotine - 21 mG/24Hr(s) Patch 1 patch Transdermal daily  ranolazine 500 milliGRAM(s) Oral two times a day  valsartan 80 milliGRAM(s) Oral daily    -------------------------------------------------------------------------------------------

## 2021-06-06 NOTE — PROGRESS NOTE ADULT - SUBJECTIVE AND OBJECTIVE BOX
[facility] Division of Hospital Medicine  Faith Shukla MD  Pager (M-F, 8A-5P): [phone]  Other Times:  [phone]    Patient is a 61y old  Male who presents with a chief complaint of chest pain (06 Jun 2021 08:10)    SUBJECTIVE / OVERNIGHT EVENTS:  denies chest pain at this time; + passing gas     MEDICATIONS  (STANDING):  amLODIPine   Tablet 10 milliGRAM(s) Oral daily  artificial  tears Solution 1 Drop(s) Both EYES four times a day  aspirin enteric coated 81 milliGRAM(s) Oral daily  atorvastatin 80 milliGRAM(s) Oral at bedtime  dextrose 40% Gel 15 Gram(s) Oral once  dextrose 5%. 1000 milliLiter(s) (50 mL/Hr) IV Continuous <Continuous>  dextrose 5%. 1000 milliLiter(s) (100 mL/Hr) IV Continuous <Continuous>  dextrose 50% Injectable 25 Gram(s) IV Push once  dextrose 50% Injectable 12.5 Gram(s) IV Push once  dextrose 50% Injectable 25 Gram(s) IV Push once  enoxaparin Injectable 40 milliGRAM(s) SubCutaneous daily  glucagon  Injectable 1 milliGRAM(s) IntraMuscular once  insulin lispro (ADMELOG) corrective regimen sliding scale   SubCutaneous three times a day before meals  metoprolol succinate ER 50 milliGRAM(s) Oral daily  nicotine - 21 mG/24Hr(s) Patch 1 patch Transdermal daily  ranolazine 500 milliGRAM(s) Oral two times a day  valsartan 80 milliGRAM(s) Oral daily    MEDICATIONS  (PRN):      CAPILLARY BLOOD GLUCOSE      POCT Blood Glucose.: 170 mg/dL (06 Jun 2021 13:12)  POCT Blood Glucose.: 173 mg/dL (06 Jun 2021 08:40)  POCT Blood Glucose.: 122 mg/dL (05 Jun 2021 22:59)  POCT Blood Glucose.: 219 mg/dL (05 Jun 2021 16:33)    I&O's Summary      PHYSICAL EXAM:  Vital Signs Last 24 Hrs  T(C): 36.9 (06 Jun 2021 13:20), Max: 37.4 (06 Jun 2021 05:28)  T(F): 98.5 (06 Jun 2021 13:20), Max: 99.4 (06 Jun 2021 05:28)  HR: 85 (06 Jun 2021 13:20) (64 - 85)  BP: 138/95 (06 Jun 2021 13:20) (138/51 - 150/65)  BP(mean): 81 (06 Jun 2021 05:28) (81 - 81)  RR: 17 (06 Jun 2021 13:20) (16 - 22)  SpO2: 98% (06 Jun 2021 13:20) (98% - 100%)    CONSTITUTIONAL: NAD, well-developed, well-groomed  EYES: EOMI; conjunctiva and sclera clear  ENMT: Moist oral mucosa  RESPIRATORY: Normal respiratory effort; lungs are clear to auscultation bilaterally  CARDIOVASCULAR: Regular rate and rhythm, normal S1 and S2, no murmur; No lower extremity edema  ABDOMEN: Nontender to palpation, normoactive bowel sounds, no rebound/guarding  MUSCULOSKELETAL:   no clubbing or cyanosis of digits; no joint swelling or tenderness to palpation  PSYCH: A+O to person, place, and time; affect appropriate  NEUROLOGY: CN 2-12 are intact and symmetric; no gross sensory deficits   SKIN: No rashes; no palpable lesions    LABS:                        13.2   9.54  )-----------( 243      ( 06 Jun 2021 06:58 )             40.8     06-06    135  |  101  |  14  ----------------------------<  159<H>  4.7   |  19<L>  |  1.48<H>    Ca    9.2      06 Jun 2021 06:58  Phos  3.4     06-06  Mg     2.3     06-06    TPro  7.4  /  Alb  3.8  /  TBili  0.7  /  DBili  x   /  AST  11  /  ALT  12  /  AlkPhos  97  06-06    PT/INR - ( 05 Jun 2021 02:53 )   PT: 10.9 sec;   INR: 0.95 ratio         PTT - ( 05 Jun 2021 02:53 )  PTT:31.6 sec  CARDIAC MARKERS ( 05 Jun 2021 09:56 )  x     / x     / 70 U/L / x     / 2.0 ng/mL  CARDIAC MARKERS ( 05 Jun 2021 02:53 )  x     / x     / x     / x     / 1.8 ng/mL          RADIOLOGY & ADDITIONAL TESTS:  Results Reviewed:   Imaging Personally Reviewed:  Electrocardiogram Personally Reviewed:    COORDINATION OF CARE:  Care Discussed with Consultants/Other Providers [Y/N]: Y  Prior or Outpatient Records Reviewed [Y/N]: Y

## 2021-06-07 LAB
ANION GAP SERPL CALC-SCNC: 14 MMOL/L — SIGNIFICANT CHANGE UP (ref 7–14)
APTT BLD: 33.1 SEC — SIGNIFICANT CHANGE UP (ref 27–36.3)
BUN SERPL-MCNC: 20 MG/DL — SIGNIFICANT CHANGE UP (ref 7–23)
CALCIUM SERPL-MCNC: 8.7 MG/DL — SIGNIFICANT CHANGE UP (ref 8.4–10.5)
CHLORIDE SERPL-SCNC: 102 MMOL/L — SIGNIFICANT CHANGE UP (ref 98–107)
CO2 SERPL-SCNC: 19 MMOL/L — LOW (ref 22–31)
CREAT SERPL-MCNC: 1.59 MG/DL — HIGH (ref 0.5–1.3)
GLUCOSE SERPL-MCNC: 165 MG/DL — HIGH (ref 70–99)
HCT VFR BLD CALC: 37.5 % — LOW (ref 39–50)
HGB BLD-MCNC: 12.4 G/DL — LOW (ref 13–17)
INR BLD: 0.99 RATIO — SIGNIFICANT CHANGE UP (ref 0.88–1.16)
MAGNESIUM SERPL-MCNC: 2.2 MG/DL — SIGNIFICANT CHANGE UP (ref 1.6–2.6)
MCHC RBC-ENTMCNC: 27.3 PG — SIGNIFICANT CHANGE UP (ref 27–34)
MCHC RBC-ENTMCNC: 33.1 GM/DL — SIGNIFICANT CHANGE UP (ref 32–36)
MCV RBC AUTO: 82.4 FL — SIGNIFICANT CHANGE UP (ref 80–100)
NRBC # BLD: 0 /100 WBCS — SIGNIFICANT CHANGE UP
NRBC # FLD: 0 K/UL — SIGNIFICANT CHANGE UP
PHOSPHATE SERPL-MCNC: 4.7 MG/DL — HIGH (ref 2.5–4.5)
PLATELET # BLD AUTO: 241 K/UL — SIGNIFICANT CHANGE UP (ref 150–400)
POTASSIUM SERPL-MCNC: 4.2 MMOL/L — SIGNIFICANT CHANGE UP (ref 3.5–5.3)
POTASSIUM SERPL-SCNC: 4.2 MMOL/L — SIGNIFICANT CHANGE UP (ref 3.5–5.3)
PROTHROM AB SERPL-ACNC: 11.4 SEC — SIGNIFICANT CHANGE UP (ref 10.6–13.6)
RBC # BLD: 4.55 M/UL — SIGNIFICANT CHANGE UP (ref 4.2–5.8)
RBC # FLD: 14.8 % — HIGH (ref 10.3–14.5)
SODIUM SERPL-SCNC: 135 MMOL/L — SIGNIFICANT CHANGE UP (ref 135–145)
WBC # BLD: 9.3 K/UL — SIGNIFICANT CHANGE UP (ref 3.8–10.5)
WBC # FLD AUTO: 9.3 K/UL — SIGNIFICANT CHANGE UP (ref 3.8–10.5)

## 2021-06-07 PROCEDURE — 93306 TTE W/DOPPLER COMPLETE: CPT | Mod: 26

## 2021-06-07 PROCEDURE — 99233 SBSQ HOSP IP/OBS HIGH 50: CPT

## 2021-06-07 RX ADMIN — RANOLAZINE 500 MILLIGRAM(S): 500 TABLET, FILM COATED, EXTENDED RELEASE ORAL at 05:30

## 2021-06-07 RX ADMIN — Medication 1: at 09:53

## 2021-06-07 RX ADMIN — Medication 1 DROP(S): at 22:06

## 2021-06-07 RX ADMIN — Medication 1 PATCH: at 20:09

## 2021-06-07 RX ADMIN — ATORVASTATIN CALCIUM 80 MILLIGRAM(S): 80 TABLET, FILM COATED ORAL at 22:06

## 2021-06-07 RX ADMIN — Medication 1 PATCH: at 12:51

## 2021-06-07 RX ADMIN — Medication 1: at 17:46

## 2021-06-07 RX ADMIN — Medication 1 DROP(S): at 05:30

## 2021-06-07 RX ADMIN — Medication 81 MILLIGRAM(S): at 12:53

## 2021-06-07 RX ADMIN — ENOXAPARIN SODIUM 40 MILLIGRAM(S): 100 INJECTION SUBCUTANEOUS at 12:53

## 2021-06-07 RX ADMIN — Medication 1 DROP(S): at 17:47

## 2021-06-07 RX ADMIN — Medication 75 MILLIGRAM(S): at 05:32

## 2021-06-07 RX ADMIN — AMLODIPINE BESYLATE 10 MILLIGRAM(S): 2.5 TABLET ORAL at 05:31

## 2021-06-07 RX ADMIN — VALSARTAN 80 MILLIGRAM(S): 80 TABLET ORAL at 05:30

## 2021-06-07 RX ADMIN — Medication 1 PATCH: at 13:39

## 2021-06-07 RX ADMIN — Medication 1 DROP(S): at 12:54

## 2021-06-07 RX ADMIN — Medication 1: at 12:53

## 2021-06-07 RX ADMIN — RANOLAZINE 500 MILLIGRAM(S): 500 TABLET, FILM COATED, EXTENDED RELEASE ORAL at 17:47

## 2021-06-07 RX ADMIN — Medication 1 PATCH: at 15:31

## 2021-06-07 NOTE — PROGRESS NOTE ADULT - ASSESSMENT
61 M complex CAD, current smoker, w/ HTN, HLD, type II DM, CAD s/p CABG (2003), with recent cardiac cath on 6/2/21 w/ unsuccessful PCI to [facility] , presented for evaluation of angina. Troponin remains negative. Patient has had no further episodes of CP since admission.     Plan:   Continue aspirin, atorvastatin 80  Has BP room to increase anti-anginal medications: Consider increasing Metoprolol XL 70 mg daily (if he has gotten his 50 mg AM dose already, just give additional XL 25 mg.   Continue Ranolazine   Continue amlodipine and valsartan for HTN  Contact Dr. Ray Albrecht ([facility] [phone]).  Possible LHC at [facility] later today.

## 2021-06-07 NOTE — PROGRESS NOTE ADULT - ASSESSMENT
62 yo Setswana speaking male s/p unsuccessful PCI to pLCx on 6/2/21 in [facility], CAD s/p CABG in 03, HTN, HLD, DM2, carotid bruit, CKD p/w episode of exertional CP PTA improved with sl NTG.  Plan for cath at [facility]

## 2021-06-07 NOTE — PROGRESS NOTE ADULT - SUBJECTIVE AND OBJECTIVE BOX
[facility] Division of Hospital Medicine  Faith Shukla MD  Pager (M-F, 8A-5P): [phone]  Other Times:      Patient is a 61y old  Male who presents with a chief complaint of CP (2021 06:54)      SUBJECTIVE / OVERNIGHT EVENTS:      MEDICATIONS  (STANDING):  amLODIPine   Tablet 10 milliGRAM(s) Oral daily  artificial  tears Solution 1 Drop(s) Both EYES four times a day  aspirin enteric coated 81 milliGRAM(s) Oral daily  atorvastatin 80 milliGRAM(s) Oral at bedtime  dextrose 40% Gel 15 Gram(s) Oral once  dextrose 5%. 1000 milliLiter(s) (50 mL/Hr) IV Continuous <Continuous>  dextrose 5%. 1000 milliLiter(s) (100 mL/Hr) IV Continuous <Continuous>  dextrose 50% Injectable 25 Gram(s) IV Push once  dextrose 50% Injectable 12.5 Gram(s) IV Push once  dextrose 50% Injectable 25 Gram(s) IV Push once  enoxaparin Injectable 40 milliGRAM(s) SubCutaneous daily  glucagon  Injectable 1 milliGRAM(s) IntraMuscular once  insulin lispro (ADMELOG) corrective regimen sliding scale   SubCutaneous three times a day before meals  metoprolol succinate ER 75 milliGRAM(s) Oral daily  nicotine - 21 mG/24Hr(s) Patch 1 patch Transdermal daily  ranolazine 500 milliGRAM(s) Oral two times a day  valsartan 80 milliGRAM(s) Oral daily    MEDICATIONS  (PRN):      CAPILLARY BLOOD GLUCOSE      POCT Blood Glucose.: 174 mg/dL (2021 08:24)  POCT Blood Glucose.: 163 mg/dL (2021 06:06)  POCT Blood Glucose.: 173 mg/dL (2021 22:33)  POCT Blood Glucose.: 139 mg/dL (2021 17:43)  POCT Blood Glucose.: 170 mg/dL (2021 13:12)    I&O's Summary      PHYSICAL EXAM:  Vital Signs Last 24 Hrs  T(C): 36.8 (2021 05:29), Max: 36.9 (2021 13:20)  T(F): 98.3 (2021 05:29), Max: 98.5 (2021 13:20)  HR: 73 (2021 05:29) (72 - 85)  BP: 139/73 (2021 05:29) (138/69 - 139/73)  BP(mean): --  RR: 18 (2021 05:29) (16 - 18)  SpO2: 98% (2021 05:29) (98% - 100%)    CONSTITUTIONAL: NAD, well-developed, well-groomed  EYES: EOMI; conjunctiva and sclera clear  ENMT: Moist oral mucosa  RESPIRATORY: Normal respiratory effort; lungs are clear to auscultation bilaterally  CARDIOVASCULAR: Regular rate and rhythm, normal S1 and S2, no murmur; No lower extremity edema  ABDOMEN: Nontender to palpation, normoactive bowel sounds, no rebound/guarding  MUSCULOSKELETAL:   no clubbing or cyanosis of digits; no joint swelling or tenderness to palpation  PSYCH: A+O to person, place, and time; affect appropriate  NEUROLOGY: CN 2-12 are intact and symmetric; no gross sensory deficits   SKIN: No rashes; no palpable lesions    LABS:                        12.4   9.30  )-----------( 241      ( 2021 07:15 )             37.5     06-07    135  |  102  |  20  ----------------------------<  165<H>  4.2   |  19<L>  |  1.59<H>    Ca    8.7      2021 07:15  Phos  4.7     06-07  Mg     2.2     06-07    TPro  7.4  /  Alb  3.8  /  TBili  0.7  /  DBili  x   /  AST  11  /  ALT  12  /  AlkPhos  97  06-06    PT/INR - ( 2021 07:15 )   PT: 11.4 sec;   INR: 0.99 ratio         PTT - ( 2021 07:15 )  PTT:33.1 sec  CARDIAC MARKERS ( 2021 09:56 )  x     / x     / 70 U/L / x     / 2.0 ng/mL      Urinalysis Basic - ( 2021 22:24 )    Color: Light Yellow / Appearance: Clear / S.008 / pH: x  Gluc: x / Ketone: Negative  / Bili: Negative / Urobili: <2 mg/dL   Blood: x / Protein: Negative / Nitrite: Negative   Leuk Esterase: Negative / RBC: x / WBC x   Sq Epi: x / Non Sq Epi: x / Bacteria: x        RADIOLOGY & ADDITIONAL TESTS:  Results Reviewed:   Imaging Personally Reviewed:  Electrocardiogram Personally Reviewed:    COORDINATION OF CARE:  Care Discussed with Consultants/Other Providers [Y/N]: Y  Prior or Outpatient Records Reviewed [Y/N]: Y   [facility]  Faith Shukla MD  Pager (M-F, 8A-5P): [phone]  Other Times:      Patient is a 61y old  Male who presents with a chief complaint of CP (2021 06:54)    SUBJECTIVE / OVERNIGHT EVENTS:  no chest pain today     MEDICATIONS  (STANDING):  amLODIPine   Tablet 10 milliGRAM(s) Oral daily  artificial  tears Solution 1 Drop(s) Both EYES four times a day  aspirin enteric coated 81 milliGRAM(s) Oral daily  atorvastatin 80 milliGRAM(s) Oral at bedtime  dextrose 40% Gel 15 Gram(s) Oral once  dextrose 5%. 1000 milliLiter(s) (50 mL/Hr) IV Continuous <Continuous>  dextrose 5%. 1000 milliLiter(s) (100 mL/Hr) IV Continuous <Continuous>  dextrose 50% Injectable 25 Gram(s) IV Push once  dextrose 50% Injectable 12.5 Gram(s) IV Push once  dextrose 50% Injectable 25 Gram(s) IV Push once  enoxaparin Injectable 40 milliGRAM(s) SubCutaneous daily  glucagon  Injectable 1 milliGRAM(s) IntraMuscular once  insulin lispro (ADMELOG) corrective regimen sliding scale   SubCutaneous three times a day before meals  metoprolol succinate ER 75 milliGRAM(s) Oral daily  nicotine - 21 mG/24Hr(s) Patch 1 patch Transdermal daily  ranolazine 500 milliGRAM(s) Oral two times a day  valsartan 80 milliGRAM(s) Oral daily    MEDICATIONS  (PRN):      CAPILLARY BLOOD GLUCOSE      POCT Blood Glucose.: 174 mg/dL (2021 08:24)  POCT Blood Glucose.: 163 mg/dL (2021 06:06)  POCT Blood Glucose.: 173 mg/dL (2021 22:33)  POCT Blood Glucose.: 139 mg/dL (2021 17:43)  POCT Blood Glucose.: 170 mg/dL (2021 13:12)    I&O's Summary      PHYSICAL EXAM:  Vital Signs Last 24 Hrs  T(C): 36.8 (2021 05:29), Max: 36.9 (2021 13:20)  T(F): 98.3 (2021 05:29), Max: 98.5 (2021 13:20)  HR: 73 (2021 05:29) (72 - 85)  BP: 139/73 (2021 05:29) (138/69 - 139/73)  BP(mean): --  RR: 18 (2021 05:29) (16 - 18)  SpO2: 98% (2021 05:29) (98% - 100%)    CONSTITUTIONAL: NAD, well-developed, well-groomed  EYES: EOMI; conjunctiva and sclera clear  ENMT: Moist oral mucosa  RESPIRATORY: Normal respiratory effort; lungs are clear to auscultation bilaterally  CARDIOVASCULAR: Regular rate and rhythm, normal S1 and S2, no murmur; No lower extremity edema  ABDOMEN: Nontender to palpation, normoactive bowel sounds, no rebound/guarding  MUSCULOSKELETAL:   no clubbing or cyanosis of digits; no joint swelling or tenderness to palpation  PSYCH: A+O to person, place, and time; affect appropriate  NEUROLOGY: CN 2-12 are intact and symmetric; no gross sensory deficits   SKIN: No rashes; no palpable lesions    LABS:                        12.4   9.30  )-----------( 241      ( 2021 07:15 )             37.5     06-07    135  |  102  |  20  ----------------------------<  165<H>  4.2   |  19<L>  |  1.59<H>    Ca    8.7      2021 07:15  Phos  4.7     06-07  Mg     2.2     06-07    TPro  7.4  /  Alb  3.8  /  TBili  0.7  /  DBili  x   /  AST  11  /  ALT  12  /  AlkPhos  97  06-06    PT/INR - ( 2021 07:15 )   PT: 11.4 sec;   INR: 0.99 ratio         PTT - ( 2021 07:15 )  PTT:33.1 sec  CARDIAC MARKERS ( 2021 09:56 )  x     / x     / 70 U/L / x     / 2.0 ng/mL      Urinalysis Basic - ( 2021 22:24 )    Color: Light Yellow / Appearance: Clear / S.008 / pH: x  Gluc: x / Ketone: Negative  / Bili: Negative / Urobili: <2 mg/dL   Blood: x / Protein: Negative / Nitrite: Negative   Leuk Esterase: Negative / RBC: x / WBC x   Sq Epi: x / Non Sq Epi: x / Bacteria: x        RADIOLOGY & ADDITIONAL TESTS:  Results Reviewed:   Imaging Personally Reviewed:  Electrocardiogram Personally Reviewed:    COORDINATION OF CARE:  Care Discussed with Consultants/Other Providers [Y/N]: Y  Prior or Outpatient Records Reviewed [Y/N]: Y

## 2021-06-07 NOTE — PROGRESS NOTE ADULT - SUBJECTIVE AND OBJECTIVE BOX
Cardiology Attending Progress Note      Vital Signs Last 24 Hrs  T(C): 36.8 (2021 05:29), Max: 36.9 (2021 13:20)  T(F): 98.3 (2021 05:29), Max: 98.5 (2021 13:20)  HR: 73 (2021 05:29) (72 - 85)  BP: 139/73 (2021 05:29) (138/69 - 139/73)  BP(mean): --  RR: 18 (2021 05:29) (16 - 18)  SpO2: 98% (2021 05:29) (98% - 100%)    I&O's Summary  None recorded    PHYSICAL EXAM:  GENERAL: NAD  HEAD:  Atraumatic, Normocephalic.  EYES: EOMI, PERRLA, conjunctiva and sclera clear.  ENT: Moist mucous membranes.  NECK: Supple, No JVD.  CHEST/LUNG: Clear to auscultation bilaterally; No rales, rhonchi, wheezing, or rubs. Unlabored respirations.  HEART: Regular rate and rhythm; No murmurs, rubs, or gallops.  ABDOMEN: Bowel sounds present; Soft, Nontender, Nondistended.   EXTREMITIES:  2+ Peripheral Pulses, brisk capillary refill. No clubbing, cyanosis, or edema.  PSYCH: Normal affect.  SKIN: No rashes or lesions.  -------------------------------------------------------------------------------------------    MEDICATIONS  (STANDING):  amLODIPine   Tablet 10 milliGRAM(s) Oral daily  artificial  tears Solution 1 Drop(s) Both EYES four times a day  aspirin enteric coated 81 milliGRAM(s) Oral daily  atorvastatin 80 milliGRAM(s) Oral at bedtime  dextrose 40% Gel 15 Gram(s) Oral once  dextrose 5%. 1000 milliLiter(s) (50 mL/Hr) IV Continuous <Continuous>  dextrose 5%. 1000 milliLiter(s) (100 mL/Hr) IV Continuous <Continuous>  dextrose 50% Injectable 25 Gram(s) IV Push once  dextrose 50% Injectable 12.5 Gram(s) IV Push once  dextrose 50% Injectable 25 Gram(s) IV Push once  enoxaparin Injectable 40 milliGRAM(s) SubCutaneous daily  glucagon  Injectable 1 milliGRAM(s) IntraMuscular once  insulin lispro (ADMELOG) corrective regimen sliding scale   SubCutaneous three times a day before meals  metoprolol succinate ER 75 milliGRAM(s) Oral daily  nicotine - 21 mG/24Hr(s) Patch 1 patch Transdermal daily  ranolazine 500 milliGRAM(s) Oral two times a day  valsartan 80 milliGRAM(s) Oral daily      LABS:                                        13.2   9.54  )-----------( 243      ( 2021 06:58 )             40.8       06-06    135  |  101  |  14  ----------------------------<  159<H>  4.7   |  19<L>  |  1.48<H>    Ca    9.2      2021 06:58  Phos  3.4     06-06  Mg     2.3     06-06    TPro  7.4  /  Alb  3.8  /  TBili  0.7  /  DBili  x   /  AST  11  /  ALT  12  /  AlkPhos  97  06-06       < from: Xray Chest 1 View- PORTABLE-Urgent (21 @ 03:10) >    EXAM:  XR CHEST PORTABLE URGENT 1V        PROCEDURE DATE:  2021         INTERPRETATION:  CLINICAL INFORMATION: Chest pain.    TECHNIQUE: Frontal radiograph of the chest.    COMPARISON: Chest x-ray 2016.    IMPRESSION:  Clear lungs. No pleural effusions or pneumothorax.    Sternal wires and multiple left mediastinal surgical clips present. Slightly prominent appearing cardiac silhouette however inaccurately assessed on the projection.    Trachea midline.    Unremarkable osseous structures.            DANNY ROMERO MD; Resident Radiology  This document has been electronically signed.  ARLINE BRAGG MD; Attending Radiologist  This document has been electronically signed. 2021 10:23AM    < end of copied text >  < from: Cardiac Cath Lab - Adult (21 @ 11:18) >          Study Date:     2021     Name:           LUKE  JEAN     :            1960     Gender:         male     MR#:            [id number]     [facility]#:           [id number]     Patient Class:  Outpatient         Cath Lab Report      Diagnostic Cardiologist:       Ray Albrecht MD     Circulator:                    Rosendo Torres RN     Monitor:                       Karen Taylor RN     Scrub:                         Shelton Jacques     Referring Physician:           Ray Albrecht MD     Fellow:                        Gulshan Lorenz MD     Circulator:                    Ruba French RN     Diagnostic Cardiologist:       Johnnie Lentz MD     Diagnostic Cardiologist:       Johnnie Lentz MD         Procedures Performed     Procedures:           1.    Art Access - L ulnar artery         2.    Left Coronary Angio     3.    Left Heart Cath without Ventriculogram     4.    Right Coronary Angio         Indications:                CCS Class III     Lab Visit Indication:      new onset angina (less than or equal to 2    months)    PCI Status:                elective         Diagnostic Conclusions:         1. s/p CABG with the following bypass anatomy:     a) Patent LIMA-LAD.      b) Patent SVG to the RCA (this SVG was divided in themid-portion with    one arm that feeds the R PDA and another arm that    feeds the R PL.      c) Patent freed left radial to the diagonal.          2. Catheterization reveals ischemic LCx territory with severe highly    calcified 90-95% prox-LCx stenosis; this LCx territory is not    fed by bypass grafts. The LAD and RCA territory are adequately    supplied.    3. On this cathererization, there was an attempt to PCI the prox-LCx,    however, there was poor guide support via left radial using    6Fr EBU 3.5.      4. Will need to consider re-attempt via femoral access (or right    radial) with 7 Fr EBU 3.75 guide. May need    shockwave/atherectomy due to calcification.     5. Left ulnar access (hx of left radial artery harvest for bypass).             Acute complication:    No complications         Procedure Narrative:         Patient: JEAN BUTLER          MRN: [id number]    Study Date: 2021   11:18 AM      Page 1 of 4                    The risks and alternatives of the procedures and conscious sedation    were explained to the patient and informed consent was    obtained. The patient was brought to the cath lab and placed on the    table.        Diagnostic Findings:         Coronary Angiography     The coronary circulation is right dominant.          LM     Left main artery: Mild CAD..          LAD     Left anterior descending artery: Mid-occluded, but fortunately fed by    patent LIMA-LAD and radial graft to the diagonal. .        CX     Circumflex: highly calcified 90-95% prox-LCx stenosis; this LCx    territory is not fed by bypass grafts.        RCA     Right coronary artery: Not engaged, but likely occuded but fortunately    has patent SVG to this territory..        Clinical Data:     Hypertension:               Yes     Dyslipidemia:                                Yes     Prior MI:                                    No     Prior PCI:                                   No     Family History of Premature CAD:             Yes     Cerebrovascular Disease:                     No     Peripheral Arterial Disease:                 Yes     Prior CABG:                                  Yes     Tobacco Use:                                 Never     Cardiac Arrest Out of Hospital:              No     Cardiac Arrest at Transferring Facility:     No     Prior Heart Failure:                         No     Diabetes:                                    No     CSHA ([place] Study of Health and Aging)    5 - Mildly Frail     Frailty Scale:     Radiation Summary:     Total Dose Area Product:            367390 mGycm2     Total Air Kerma:                    1024 mGy     Total Fluoro Time:                  30.4 min         Exam Start Time:   11:18 AM     Exam End Time:     12:29 PM     Exam Duration:     71min             Electronically signed by Ray Albrecht MD on 2021 at 06:23 AM     (No Signature Object)         Patient: JEAN BUTLER          MRN: [id number]    Study Date: 2021   11:18 AM      Page 4 of 4

## 2021-06-08 LAB
ANION GAP SERPL CALC-SCNC: 15 MMOL/L — HIGH (ref 7–14)
BUN SERPL-MCNC: 24 MG/DL — HIGH (ref 7–23)
CALCIUM SERPL-MCNC: 9.2 MG/DL — SIGNIFICANT CHANGE UP (ref 8.4–10.5)
CHLORIDE SERPL-SCNC: 101 MMOL/L — SIGNIFICANT CHANGE UP (ref 98–107)
CO2 SERPL-SCNC: 19 MMOL/L — LOW (ref 22–31)
CREAT SERPL-MCNC: 1.58 MG/DL — HIGH (ref 0.5–1.3)
GLUCOSE SERPL-MCNC: 142 MG/DL — HIGH (ref 70–99)
HCT VFR BLD CALC: 39 % — SIGNIFICANT CHANGE UP (ref 39–50)
HGB BLD-MCNC: 12.6 G/DL — LOW (ref 13–17)
MAGNESIUM SERPL-MCNC: 2.2 MG/DL — SIGNIFICANT CHANGE UP (ref 1.6–2.6)
MCHC RBC-ENTMCNC: 27.2 PG — SIGNIFICANT CHANGE UP (ref 27–34)
MCHC RBC-ENTMCNC: 32.3 GM/DL — SIGNIFICANT CHANGE UP (ref 32–36)
MCV RBC AUTO: 84.1 FL — SIGNIFICANT CHANGE UP (ref 80–100)
NRBC # BLD: 0 /100 WBCS — SIGNIFICANT CHANGE UP
NRBC # FLD: 0 K/UL — SIGNIFICANT CHANGE UP
PLATELET # BLD AUTO: 264 K/UL — SIGNIFICANT CHANGE UP (ref 150–400)
POTASSIUM SERPL-MCNC: 4.5 MMOL/L — SIGNIFICANT CHANGE UP (ref 3.5–5.3)
POTASSIUM SERPL-SCNC: 4.5 MMOL/L — SIGNIFICANT CHANGE UP (ref 3.5–5.3)
RBC # BLD: 4.64 M/UL — SIGNIFICANT CHANGE UP (ref 4.2–5.8)
RBC # FLD: 14.8 % — HIGH (ref 10.3–14.5)
SODIUM SERPL-SCNC: 135 MMOL/L — SIGNIFICANT CHANGE UP (ref 135–145)
WBC # BLD: 8.83 K/UL — SIGNIFICANT CHANGE UP (ref 3.8–10.5)
WBC # FLD AUTO: 8.83 K/UL — SIGNIFICANT CHANGE UP (ref 3.8–10.5)

## 2021-06-08 PROCEDURE — 93459 L HRT ART/GRFT ANGIO: CPT | Mod: 26

## 2021-06-08 PROCEDURE — 93010 ELECTROCARDIOGRAM REPORT: CPT

## 2021-06-08 PROCEDURE — 99152 MOD SED SAME PHYS/QHP 5/>YRS: CPT

## 2021-06-08 PROCEDURE — 99233 SBSQ HOSP IP/OBS HIGH 50: CPT

## 2021-06-08 PROCEDURE — 99232 SBSQ HOSP IP/OBS MODERATE 35: CPT

## 2021-06-08 RX ORDER — SODIUM CHLORIDE 9 MG/ML
500 INJECTION INTRAMUSCULAR; INTRAVENOUS; SUBCUTANEOUS
Refills: 0 | Status: DISCONTINUED | OUTPATIENT
Start: 2021-06-08 | End: 2021-06-09

## 2021-06-08 RX ADMIN — Medication 1 DROP(S): at 05:39

## 2021-06-08 RX ADMIN — AMLODIPINE BESYLATE 10 MILLIGRAM(S): 2.5 TABLET ORAL at 05:39

## 2021-06-08 RX ADMIN — SODIUM CHLORIDE 150 MILLILITER(S): 9 INJECTION INTRAMUSCULAR; INTRAVENOUS; SUBCUTANEOUS at 17:30

## 2021-06-08 RX ADMIN — Medication 1 PATCH: at 14:31

## 2021-06-08 RX ADMIN — VALSARTAN 80 MILLIGRAM(S): 80 TABLET ORAL at 05:39

## 2021-06-08 RX ADMIN — Medication 81 MILLIGRAM(S): at 11:16

## 2021-06-08 RX ADMIN — Medication 1 DROP(S): at 23:00

## 2021-06-08 RX ADMIN — Medication 1: at 08:58

## 2021-06-08 RX ADMIN — Medication 75 MILLIGRAM(S): at 05:39

## 2021-06-08 RX ADMIN — Medication 3: at 11:16

## 2021-06-08 RX ADMIN — ATORVASTATIN CALCIUM 80 MILLIGRAM(S): 80 TABLET, FILM COATED ORAL at 21:23

## 2021-06-08 RX ADMIN — Medication 1 PATCH: at 08:41

## 2021-06-08 RX ADMIN — RANOLAZINE 500 MILLIGRAM(S): 500 TABLET, FILM COATED, EXTENDED RELEASE ORAL at 05:39

## 2021-06-08 NOTE — CHART NOTE - NSCHARTNOTEFT_GEN_A_CORE
Patient s/p cardiac cath, seen and examined at bedside, appears comfortable NAD, offers no complaints, Rt groin appears clean, dry, intact, no evidence of active bleeding, no hematoma, + pulses. Continue to monitor.

## 2021-06-08 NOTE — PROGRESS NOTE ADULT - ASSESSMENT
62 yo Armenian speaking male s/p unsuccessful PCI to pLCx on 6/2/21 in [facility], CAD s/p CABG in 03, HTN, HLD, DM2, carotid bruit, CKD p/w episode of exertional CP PTA improved with sl NTG.  Plan for cath at [facility] today.

## 2021-06-08 NOTE — PROGRESS NOTE ADULT - ASSESSMENT
61 M complex CAD, current smoker, w/ HTN, HLD, type II DM, CAD s/p CABG (2003), with recent cardiac cath on 6/2/21 w/ unsuccessful PCI to pLCx , presented for evaluation of angina. Troponin remains negative. Patient has had no further episodes of CP since admission.     Echocardiogram noted normal biventricular systolic function, mild diastolic dysfunction, LVH    Plan:   Continue aspirin, atorvastatin 80, Toprol XL 75  Continue Ranolazine   Continue amlodipine and valsartan for HTN  Spoke with Dr. Ray Albrecht ([facility] [phone]) yesterday -- they attempted native pLCx revascularization but was unsuccessful and had planned to try again.  Family wants procedure performed at [facility],  Plan for LHC with Dr. Cordova later today.

## 2021-06-08 NOTE — PROGRESS NOTE ADULT - SUBJECTIVE AND OBJECTIVE BOX
[facility] Division of Hospital Medicine  Faith Shukla MD  Pager (M-F, 8A-5P): [phone]  Other Times:      Patient is a 61y old  Male who presents with a chief complaint of CP (2021 06:54)      SUBJECTIVE / OVERNIGHT EVENTS:      MEDICATIONS  (STANDING):  amLODIPine   Tablet 10 milliGRAM(s) Oral daily  artificial  tears Solution 1 Drop(s) Both EYES four times a day  aspirin enteric coated 81 milliGRAM(s) Oral daily  atorvastatin 80 milliGRAM(s) Oral at bedtime  dextrose 40% Gel 15 Gram(s) Oral once  dextrose 5%. 1000 milliLiter(s) (50 mL/Hr) IV Continuous <Continuous>  dextrose 5%. 1000 milliLiter(s) (100 mL/Hr) IV Continuous <Continuous>  dextrose 50% Injectable 25 Gram(s) IV Push once  dextrose 50% Injectable 12.5 Gram(s) IV Push once  dextrose 50% Injectable 25 Gram(s) IV Push once  enoxaparin Injectable 40 milliGRAM(s) SubCutaneous daily  glucagon  Injectable 1 milliGRAM(s) IntraMuscular once  insulin lispro (ADMELOG) corrective regimen sliding scale   SubCutaneous three times a day before meals  metoprolol succinate ER 75 milliGRAM(s) Oral daily  nicotine - 21 mG/24Hr(s) Patch 1 patch Transdermal daily  ranolazine 500 milliGRAM(s) Oral two times a day  valsartan 80 milliGRAM(s) Oral daily    MEDICATIONS  (PRN):      CAPILLARY BLOOD GLUCOSE      POCT Blood Glucose.: 175 mg/dL (2021 08:13)  POCT Blood Glucose.: 203 mg/dL (2021 22:05)  POCT Blood Glucose.: 189 mg/dL (2021 17:23)  POCT Blood Glucose.: 154 mg/dL (2021 12:05)  POCT Blood Glucose.: 157 mg/dL (2021 09:49)    I&O's Summary      PHYSICAL EXAM:  Vital Signs Last 24 Hrs  T(C): 36.8 (2021 05:38), Max: 36.8 (2021 05:38)  T(F): 98.2 (2021 05:38), Max: 98.2 (2021 05:38)  HR: 62 (2021 05:38) (62 - 79)  BP: 134/71 (2021 05:38) (113/60 - 136/71)  BP(mean): --  RR: 16 (2021 05:38) (16 - 18)  SpO2: 99% (2021 05:38) (98% - 99%)    CONSTITUTIONAL: NAD, well-developed, well-groomed  EYES: EOMI; conjunctiva and sclera clear  ENMT: Moist oral mucosa  RESPIRATORY: Normal respiratory effort; lungs are clear to auscultation bilaterally  CARDIOVASCULAR: Regular rate and rhythm, normal S1 and S2, no murmur; No lower extremity edema  ABDOMEN: Nontender to palpation, normoactive bowel sounds, no rebound/guarding  MUSCULOSKELETAL:   no clubbing or cyanosis of digits; no joint swelling or tenderness to palpation  PSYCH: A+O to person, place, and time; affect appropriate  NEUROLOGY: CN 2-12 are intact and symmetric; no gross sensory deficits   SKIN: No rashes; no palpable lesions    LABS:                        12.6   8.83  )-----------( 264      ( 2021 05:00 )             39.0     06-08    135  |  101  |  24<H>  ----------------------------<  142<H>  4.5   |  19<L>  |  1.58<H>    Ca    9.2      2021 05:00  Phos  4.7     06-07  Mg     2.2     06-08      PT/INR - ( 2021 07:15 )   PT: 11.4 sec;   INR: 0.99 ratio         PTT - ( 2021 07:15 )  PTT:33.1 sec      Urinalysis Basic - ( 2021 22:24 )    Color: Light Yellow / Appearance: Clear / S.008 / pH: x  Gluc: x / Ketone: Negative  / Bili: Negative / Urobili: <2 mg/dL   Blood: x / Protein: Negative / Nitrite: Negative   Leuk Esterase: Negative / RBC: x / WBC x   Sq Epi: x / Non Sq Epi: x / Bacteria: x        RADIOLOGY & ADDITIONAL TESTS:  Results Reviewed:   Imaging Personally Reviewed:  Electrocardiogram Personally Reviewed:    COORDINATION OF CARE:  Care Discussed with Consultants/Other Providers [Y/N]: Y  Prior or Outpatient Records Reviewed [Y/N]: Y   [facility]  Faith Shukla MD  Pager (M-F, 8A-5P): [phone]  Other Times:      Patient is a 61y old  Male who presents with a chief complaint of CP (2021 06:54)      SUBJECTIVE / OVERNIGHT EVENTS:  denies chest pain    MEDICATIONS  (STANDING):  amLODIPine   Tablet 10 milliGRAM(s) Oral daily  artificial  tears Solution 1 Drop(s) Both EYES four times a day  aspirin enteric coated 81 milliGRAM(s) Oral daily  atorvastatin 80 milliGRAM(s) Oral at bedtime  dextrose 40% Gel 15 Gram(s) Oral once  dextrose 5%. 1000 milliLiter(s) (50 mL/Hr) IV Continuous <Continuous>  dextrose 5%. 1000 milliLiter(s) (100 mL/Hr) IV Continuous <Continuous>  dextrose 50% Injectable 25 Gram(s) IV Push once  dextrose 50% Injectable 12.5 Gram(s) IV Push once  dextrose 50% Injectable 25 Gram(s) IV Push once  enoxaparin Injectable 40 milliGRAM(s) SubCutaneous daily  glucagon  Injectable 1 milliGRAM(s) IntraMuscular once  insulin lispro (ADMELOG) corrective regimen sliding scale   SubCutaneous three times a day before meals  metoprolol succinate ER 75 milliGRAM(s) Oral daily  nicotine - 21 mG/24Hr(s) Patch 1 patch Transdermal daily  ranolazine 500 milliGRAM(s) Oral two times a day  valsartan 80 milliGRAM(s) Oral daily    MEDICATIONS  (PRN):      CAPILLARY BLOOD GLUCOSE      POCT Blood Glucose.: 175 mg/dL (2021 08:13)  POCT Blood Glucose.: 203 mg/dL (2021 22:05)  POCT Blood Glucose.: 189 mg/dL (2021 17:23)  POCT Blood Glucose.: 154 mg/dL (2021 12:05)  POCT Blood Glucose.: 157 mg/dL (2021 09:49)    I&O's Summary      PHYSICAL EXAM:  Vital Signs Last 24 Hrs  T(C): 36.8 (2021 05:38), Max: 36.8 (2021 05:38)  T(F): 98.2 (2021 05:38), Max: 98.2 (2021 05:38)  HR: 62 (2021 05:38) (62 - 79)  BP: 134/71 (2021 05:38) (113/60 - 136/71)  BP(mean): --  RR: 16 (2021 05:38) (16 - 18)  SpO2: 99% (2021 05:38) (98% - 99%)    CONSTITUTIONAL: NAD, well-developed, well-groomed  EYES: EOMI; conjunctiva and sclera clear  ENMT: Moist oral mucosa  RESPIRATORY: Normal respiratory effort; lungs are clear to auscultation bilaterally  CARDIOVASCULAR: Regular rate and rhythm, normal S1 and S2, no murmur; No lower extremity edema  ABDOMEN: Nontender to palpation, normoactive bowel sounds, no rebound/guarding  MUSCULOSKELETAL:   no clubbing or cyanosis of digits; no joint swelling or tenderness to palpation  PSYCH: A+O to person, place, and time; affect appropriate  NEUROLOGY: CN 2-12 are intact and symmetric; no gross sensory deficits   SKIN: No rashes; no palpable lesions    LABS:                        12.6   8.83  )-----------( 264      ( 2021 05:00 )             39.0     06-08    135  |  101  |  24<H>  ----------------------------<  142<H>  4.5   |  19<L>  |  1.58<H>    Ca    9.2      2021 05:00  Phos  4.7     06-07  Mg     2.2     06-08      PT/INR - ( 2021 07:15 )   PT: 11.4 sec;   INR: 0.99 ratio         PTT - ( 2021 07:15 )  PTT:33.1 sec      Urinalysis Basic - ( 2021 22:24 )    Color: Light Yellow / Appearance: Clear / S.008 / pH: x  Gluc: x / Ketone: Negative  / Bili: Negative / Urobili: <2 mg/dL   Blood: x / Protein: Negative / Nitrite: Negative   Leuk Esterase: Negative / RBC: x / WBC x   Sq Epi: x / Non Sq Epi: x / Bacteria: x        RADIOLOGY & ADDITIONAL TESTS:  Results Reviewed:   Imaging Personally Reviewed:  Electrocardiogram Personally Reviewed:    COORDINATION OF CARE:  Care Discussed with Consultants/Other Providers [Y/N]: Y  Prior or Outpatient Records Reviewed [Y/N]: Y

## 2021-06-08 NOTE — PROGRESS NOTE ADULT - SUBJECTIVE AND OBJECTIVE BOX
Cardiology Attending Progress Note    Reviewed case with patient's outside cardiologist yesterday -- they attempted native pLCx revascularization but was unsuccessful.  Patient was advised to return for re-attempt.  He presented to [facility] due to persistent symptoms.  Spoke with daughter last night -- the patient would like to have his procedure performed at [facility].,  Plan for [facility] this afternoon with Dr. Romi Cordova.    No complaints when seen this AM with RN/translater phone at bedside.    Telemetry: SR 60s bpm, no events    Vital Signs Last 24 Hrs  T(C): 36.8 (2021 05:38), Max: 36.8 (2021 05:38)  T(F): 98.2 (2021 05:38), Max: 98.2 (2021 05:38)  HR: 62 (2021 05:38) (62 - 79)  BP: 134/71 (2021 05:38) (113/60 - 136/71)  BP(mean): --  RR: 16 (2021 05:38) (16 - 18)  SpO2: 99% (2021 05:38) (98% - 99%)    I&O's Summary  None recorded    GENERAL: NAD  HEENT: No JVD  CHEST/LUNG: Clear to auscultation bilaterally  HEART: Regular rate and rhythm; No murmurs  EXTREMITIES:  No edema    MEDICATIONS  (STANDING):  amLODIPine   Tablet 10 milliGRAM(s) Oral daily  artificial  tears Solution 1 Drop(s) Both EYES four times a day  aspirin enteric coated 81 milliGRAM(s) Oral daily  atorvastatin 80 milliGRAM(s) Oral at bedtime  dextrose 40% Gel 15 Gram(s) Oral once  dextrose 5%. 1000 milliLiter(s) (50 mL/Hr) IV Continuous <Continuous>  dextrose 5%. 1000 milliLiter(s) (100 mL/Hr) IV Continuous <Continuous>  dextrose 50% Injectable 25 Gram(s) IV Push once  dextrose 50% Injectable 12.5 Gram(s) IV Push once  dextrose 50% Injectable 25 Gram(s) IV Push once  enoxaparin Injectable 40 milliGRAM(s) SubCutaneous daily  glucagon  Injectable 1 milliGRAM(s) IntraMuscular once  insulin lispro (ADMELOG) corrective regimen sliding scale   SubCutaneous three times a day before meals  metoprolol succinate ER 75 milliGRAM(s) Oral daily  nicotine - 21 mG/24Hr(s) Patch 1 patch Transdermal daily  ranolazine 500 milliGRAM(s) Oral two times a day  valsartan 80 milliGRAM(s) Oral daily      LABS:                          12.6   8.83  )-----------( 264      ( 2021 05:00 )             39.0   06-08    135  |  101  |  24<H>  ----------------------------<  142<H>  4.5   |  19<L>  |  1.58<H>    Ca    9.2      2021 05:00  Phos  4.7     06-07  Mg     2.2     06-08    PT/INR - ( 2021 07:15 )   PT: 11.4 sec;   INR: 0.99 ratio    PTT - ( 2021 07:15 )  PTT:33.1 sec      < from: Xray Chest 1 View- PORTABLE-Urgent (21 @ 03:10) >    EXAM:  XR CHEST PORTABLE URGENT 1V        PROCEDURE DATE:  2021       INTERPRETATION:  CLINICAL INFORMATION: Chest pain.    TECHNIQUE: Frontal radiograph of the chest.    COMPARISON: Chest x-ray 2016.    IMPRESSION:  Clear lungs. No pleural effusions or pneumothorax.    Sternal wires and multiple left mediastinal surgical clips present. Slightly prominent appearing cardiac silhouette however inaccurately assessed on the projection.    Trachea midline.    Unremarkable osseous structures.            DANNY ROMERO MD; Resident Radiology  This document has been electronically signed.  ARLINE BRAGG MD; Attending Radiologist  This document has been electronically signed. 2021 10:23AM    < end of copied text >  < from: Cardiac Cath Lab - Adult (21 @ 11:18) >          Study Date:     2021     Name:           JEAN BUTLER     :            1960     Gender:         male     MR#:            [id number]     [facility]#:           [id number]     Patient Class:  Outpatient         Cath Lab Report      Diagnostic Cardiologist:       Ray Albrecht MD     Circulator:                    Rosendo Torres RN     Monitor:                       Karen Taylor RN     Scrub:                         Shelton Jacques     Referring Physician:           Ray Albrecht MD     Fellow:                        Gulshan Lorenz MD     Circulator:                    Ruba French RN     Diagnostic Cardiologist:       Johnnie Lentz MD     Diagnostic Cardiologist:       Johnnie Lentz MD         Procedures Performed     Procedures:           1.    Art Access - L ulnar artery         2.    Left Coronary Angio     3.    Left Heart Cath without Ventriculogram     4.    Right Coronary Angio         Indications:                CCS Class III     Lab Visit Indication:      new onset angina (less than or equal to 2    months)    PCI Status:                elective         Diagnostic Conclusions:         1. s/p CABG with the following bypass anatomy:     a) Patent LIMA-LAD.      b) Patent SVG to the RCA (this SVG was divided in themid-portion with    one arm that feeds the R PDA and another arm that    feeds the R PL.      c) Patent freed left radial to the diagonal.          2. Catheterization reveals ischemic LCx territory with severe highly    calcified 90-95% prox-LCx stenosis; this LCx territory is not    fed by bypass grafts. The LAD and RCA territory are adequately    supplied.    3. On this cathererization, there was an attempt to PCI the prox-LCx,    however, there was poor guide support via left radial using    6Fr EBU 3.5.      4. Will need to consider re-attempt via femoral access (or right    radial) with 7 Fr EBU 3.75 guide. May need    shockwave/atherectomy due to calcification.     5. Left ulnar access (hx of left radial artery harvest for bypass).             Acute complication:    No complications         Procedure Narrative:         Patient: JEAN BUTLER          MRN: [id number]    Study Date: 2021   11:18 AM      Page 1 of 4      The risks and alternatives of the procedures and conscious sedation    were explained to the patient and informed consent was    obtained. The patient was brought to the cath lab and placed on the    table.        Diagnostic Findings:         Coronary Angiography     The coronary circulation is right dominant.          LM     Left main artery: Mild CAD..          LAD     Left anterior descending artery: Mid-occluded, but fortunately fed by    patent LIMA-LAD and radial graft to the diagonal. .        CX     Circumflex: highly calcified 90-95% prox-LCx stenosis; this LCx    territory is not fed by bypass grafts.        RCA     Right coronary artery: Not engaged, but likely occuded but fortunately    has patent SVG to this territory..        Clinical Data:     Hypertension:               Yes     Dyslipidemia:                                Yes     Prior MI:                                    No     Prior PCI:                                   No     Family History of Premature CAD:             Yes     Cerebrovascular Disease:                     No     Peripheral Arterial Disease:                 Yes     Prior CABG:                                  Yes     Tobacco Use:                                 Never     Cardiac Arrest Out of Hospital:              No     Cardiac Arrest at Transferring Facility:     No     Prior Heart Failure:                         No     Diabetes:                                    No     CSHA ([place] Study of Health and Aging)    5 - Mildly Frail     Frailty Scale:     Radiation Summary:     Total Dose Area Product:            773218 mGycm2     Total Air Kerma:                    1024 mGy     Total Fluoro Time:                  30.4 min         Exam Start Time:   11:18 AM     Exam End Time:     12:29 PM     Exam Duration:     71min             Electronically signed by Ray Albrecht MD on 2021 at 06:23 AM     (No Signature Object)         Patient: JEAN BUTLER          MRN: [id number]    Study Date: 2021   11:18 AM      Page 4 of 4    < from: Transthoracic Echocardiogram (21 @ 18:21) >    Patient name: JEAN BUTLER  YOB: 1960   Age: 61 (M)   MR#: [id number]  Study Date: 2021  [facility] RDCS  Study quality: Technically good  Referring Physician: Popeye Banks MD  Blood Pressure: 113/60 mmHg  Height: 172 cm  Weight: 70 kg  BSA: 1.8 m2  ------------------------------------------------------------------------  PROCEDURE: Transthoracic echocardiogram with 2-D, M-Mode  and complete spectral and color flow Doppler.  INDICATION: Chest pain, unspecified (R07.9)  ------------------------------------------------------------------------  DIMENSIONS:  Dimensions:     Normal Values:  LA:     3.3 cm    2.0 - 4.0 cm  Ao:     3.4 cm    2.0 - 3.8 cm  SEPTUM: 1.4 cm    0.6 - 1.2 cm  PWT:1.4 cm    0.6 - 1.1 cm  LVIDd:  4.6 cm    3.0 - 5.6 cm  LVIDs:  3.0 cm    1.8 - 4.0 cm  Derived Variables:  LVMI: 140 g/m2  RWT: 0.60  Fractional short: 35 %  Ejection Fraction (Teicholtz): 64 %  ------------------------------------------------------------------------  OBSERVATIONS:  Mitral Valve: Normal mitral valve. Minimal mitral  regurgitation.  Aortic Root: Normal aortic root.  Aortic Valve: Aortic valve leaflet morphology not well  visualized.  Left Atrium: Normal left atrium.  LA volume index = 21  cc/m2.  Left Ventricle: Normal left ventricular systolic function.  No segmental wall motion abnormalities. Moderate concentric  left ventricular hypertrophy. Mild diastolic dysfunction  (Stage I).  Right Heart: Normal right atrium. Normal right ventricular  size and function. Normal tricuspid valve. Minimal  tricuspid regurgitation. Normal pulmonic valve.  Pericardium/PleuraNormal pericardium with no pericardial  effusion.  ------------------------------------------------------------------------  CONCLUSIONS:  1. Normal mitral valve. Minimal mitral regurgitation.  2. Moderate concentric left ventricular hypertrophy.  3. Normal left ventricular systolic function. No segmental  wall motion abnormalities.  4. Mild diastolic dysfunction(Stage I).  5. Normal right ventricular size and function.  ------------------------------------------------------------------------  Confirmed on  2021 - 19:36:52 by Austyn Geiger M.D.,  formerly [facility], [place]  ------------------------------------------------------------------------    < end of copied text >     right

## 2021-06-09 VITALS
TEMPERATURE: 98 F | SYSTOLIC BLOOD PRESSURE: 130 MMHG | RESPIRATION RATE: 18 BRPM | HEART RATE: 62 BPM | OXYGEN SATURATION: 100 % | DIASTOLIC BLOOD PRESSURE: 68 MMHG

## 2021-06-09 LAB
ANION GAP SERPL CALC-SCNC: 14 MMOL/L — SIGNIFICANT CHANGE UP (ref 7–14)
BUN SERPL-MCNC: 25 MG/DL — HIGH (ref 7–23)
CALCIUM SERPL-MCNC: 9.2 MG/DL — SIGNIFICANT CHANGE UP (ref 8.4–10.5)
CHLORIDE SERPL-SCNC: 100 MMOL/L — SIGNIFICANT CHANGE UP (ref 98–107)
CO2 SERPL-SCNC: 19 MMOL/L — LOW (ref 22–31)
CREAT SERPL-MCNC: 1.69 MG/DL — HIGH (ref 0.5–1.3)
GLUCOSE SERPL-MCNC: 144 MG/DL — HIGH (ref 70–99)
HCT VFR BLD CALC: 39.1 % — SIGNIFICANT CHANGE UP (ref 39–50)
HGB BLD-MCNC: 12.6 G/DL — LOW (ref 13–17)
MAGNESIUM SERPL-MCNC: 2.1 MG/DL — SIGNIFICANT CHANGE UP (ref 1.6–2.6)
MCHC RBC-ENTMCNC: 26.8 PG — LOW (ref 27–34)
MCHC RBC-ENTMCNC: 32.2 GM/DL — SIGNIFICANT CHANGE UP (ref 32–36)
MCV RBC AUTO: 83 FL — SIGNIFICANT CHANGE UP (ref 80–100)
NRBC # BLD: 0 /100 WBCS — SIGNIFICANT CHANGE UP
NRBC # FLD: 0 K/UL — SIGNIFICANT CHANGE UP
PHOSPHATE SERPL-MCNC: 5 MG/DL — HIGH (ref 2.5–4.5)
PLATELET # BLD AUTO: 265 K/UL — SIGNIFICANT CHANGE UP (ref 150–400)
POTASSIUM SERPL-MCNC: 4.5 MMOL/L — SIGNIFICANT CHANGE UP (ref 3.5–5.3)
POTASSIUM SERPL-SCNC: 4.5 MMOL/L — SIGNIFICANT CHANGE UP (ref 3.5–5.3)
RBC # BLD: 4.71 M/UL — SIGNIFICANT CHANGE UP (ref 4.2–5.8)
RBC # FLD: 14.6 % — HIGH (ref 10.3–14.5)
SODIUM SERPL-SCNC: 133 MMOL/L — LOW (ref 135–145)
WBC # BLD: 8.87 K/UL — SIGNIFICANT CHANGE UP (ref 3.8–10.5)
WBC # FLD AUTO: 8.87 K/UL — SIGNIFICANT CHANGE UP (ref 3.8–10.5)

## 2021-06-09 PROCEDURE — 99232 SBSQ HOSP IP/OBS MODERATE 35: CPT

## 2021-06-09 PROCEDURE — 99239 HOSP IP/OBS DSCHRG MGMT >30: CPT

## 2021-06-09 RX ORDER — METOPROLOL TARTRATE 50 MG
3 TABLET ORAL
Qty: 0 | Refills: 0 | DISCHARGE
Start: 2021-06-09

## 2021-06-09 RX ORDER — METOPROLOL TARTRATE 50 MG
3 TABLET ORAL
Qty: 90 | Refills: 0
Start: 2021-06-09 | End: 2021-07-08

## 2021-06-09 RX ORDER — METOPROLOL TARTRATE 50 MG
1 TABLET ORAL
Qty: 0 | Refills: 0 | DISCHARGE

## 2021-06-09 RX ADMIN — AMLODIPINE BESYLATE 10 MILLIGRAM(S): 2.5 TABLET ORAL at 05:22

## 2021-06-09 RX ADMIN — ENOXAPARIN SODIUM 40 MILLIGRAM(S): 100 INJECTION SUBCUTANEOUS at 12:32

## 2021-06-09 RX ADMIN — Medication 1 DROP(S): at 05:22

## 2021-06-09 RX ADMIN — Medication 1 PATCH: at 12:33

## 2021-06-09 RX ADMIN — Medication 1: at 12:32

## 2021-06-09 RX ADMIN — RANOLAZINE 500 MILLIGRAM(S): 500 TABLET, FILM COATED, EXTENDED RELEASE ORAL at 05:22

## 2021-06-09 RX ADMIN — VALSARTAN 80 MILLIGRAM(S): 80 TABLET ORAL at 05:22

## 2021-06-09 RX ADMIN — Medication 81 MILLIGRAM(S): at 12:32

## 2021-06-09 RX ADMIN — Medication 75 MILLIGRAM(S): at 05:22

## 2021-06-09 RX ADMIN — Medication 1 DROP(S): at 12:31

## 2021-06-09 NOTE — PROGRESS NOTE ADULT - PROBLEM SELECTOR PLAN 2
A1c of 8.1  Check Fingersticks with Meals and cover with ISS TID
A1c of 8.1  Check Fingersticks with Meals and cover with ISS TID
Check Fingersticks with Meals and cover with ISS TID, check HgbA1C
A1c of 8.1  Check Fingersticks with Meals and cover with ISS TID

## 2021-06-09 NOTE — DISCHARGE NOTE PROVIDER - CARE PROVIDER_API CALL
Stephane Veloz (MD; PhD)  Cardiology; Internal Medicine; Vascular Medicine  [phone] [place] [address], [address]  [place] [address]  Phone: [phone]  Fax: [phone]  Follow Up Time: 2 weeks

## 2021-06-09 NOTE — DISCHARGE NOTE PROVIDER - NSDCCPCAREPLAN_GEN_ALL_CORE_FT
PRINCIPAL DISCHARGE DIAGNOSIS  Diagnosis: Chest pain, unspecified type  Assessment and Plan of Treatment: You came into the hospital because you had chest pain. Anothr Left Heart Catheterization was done which showed your stents were patent and there was nothing else that that needed to be stented. You will need to follow up with Cardiology within 2-4 weeks.      SECONDARY DISCHARGE DIAGNOSES  Diagnosis: Diabetes  Assessment and Plan of Treatment: Continue your medication regimen and a consistent carbohydrate diet (Meaning eating the same amount of carbohydrates at the same time each day). Monitor blood glucose levels throughout the day before meals and at bedtime. Record blood sugars and bring to outpatient providers appointment in order to be reviewed by your doctor for management modifications. If your sugars are more than 400 or less than 70 you should contact your PCP immediately. Monitor for signs/symptoms of low blood glucose, such as, dizziness, altered mental status, or cool/clammy skin. In addition, monitor for signs/symptoms of high blood glucose, such as, feeling hot, dry, fatigued, or with increased thirst/urination. Make regular podiatry appointments in order to have feet checked for wounds and uncontrolled toe nail growth to prevent infections, as well as, appointments with an ophthalmologist to monitor your vision.    Diagnosis: Chronic kidney disease (CKD)  Assessment and Plan of Treatment:     Diagnosis: Hypertension  Assessment and Plan of Treatment: Continue blood pressure medication regimen as directed. Monitor for any visual changes, headaches or dizziness.  Monitor blood pressure regularly.  Follow up with your primary care provider for further management for high blood pressure.    Diagnosis: Hyperlipemia  Assessment and Plan of Treatment: continue your home medications.

## 2021-06-09 NOTE — DISCHARGE NOTE NURSING/CASE MANAGEMENT/SOCIAL WORK - NSDCPEWEB_GEN_ALL_CORE
[facility] for Tobacco Control website --- [email or web address]/quitsmoking/[email or web address] website --- [email or web address]

## 2021-06-09 NOTE — PROGRESS NOTE ADULT - PROBLEM SELECTOR PLAN 1
Telemetry  appreciate cardio recs   - echo  - plan for [facility] on 6/7  - metoprolol XL increased to 75 mg   Continue Ranolazine
Telemetry  appreciate cardio recs   - echo  - plan for [facility] on 6/7  - metoprolol XL 75 mg   Continue Ranolazine
Telemetry  appreciate cardio recs   - echo reviewed   - plan for [facility] on 6/8  - metoprolol XL 75 mg   Continue Ranolazine
Telemetry  appreciate cardio recs   - echo reviewed   - s/p [facility] - no intervention - medical management   - metoprolol XL 75 mg   Continue Ranolazine  - outpt cardio followup

## 2021-06-09 NOTE — PROGRESS NOTE ADULT - ASSESSMENT
60 yo Estonian speaking male s/p unsuccessful PCI to pLCx on 6/2/21 in [facility], CAD s/p CABG in 03, HTN, HLD, DM2, carotid bruit, CKD p/w episode of exertional CP PTA improved with sl NTG.  s/p C on 6/8 - no intervention.

## 2021-06-09 NOTE — DISCHARGE NOTE NURSING/CASE MANAGEMENT/SOCIAL WORK - PATIENT PORTAL LINK FT
You can access the FollowMyHealth Patient Portal offered by [facility] by registering at the following website: [email or web address]. By joining [facility]’s FollowMyHealth portal, you will also be able to view your health information using other applications (apps) compatible with our system.

## 2021-06-09 NOTE — PROGRESS NOTE ADULT - PROBLEM SELECTOR PLAN 3
Creat 1.42, GFR 55%, monitor BMP  UA neg
Creat 1.42, GFR 55%, monitor BMP, check UA
Creat 1.42, GFR 55%, monitor BMP  UA neg
Creat 1.42, GFR 55%, monitor BMP  UA neg

## 2021-06-09 NOTE — PROGRESS NOTE ADULT - PROBLEM SELECTOR PROBLEM 1
ACS (acute coronary syndrome)

## 2021-06-09 NOTE — DISCHARGE NOTE PROVIDER - HOSPITAL COURSE
60 yo French speaking male s/p unsuccessful PCI to pLCx on 6/2/21 in [facility], CAD s/p CABG in 03, HTN, HLD, DM2, carotid bruit, CKD p/w episode of exertional CP PTA improved with sl NTG. S/p cath LHC at [facility] on 6/8/21 revealed no lesions that can be revascularized , showed patent grafts.       #ACS r/o  -started on Telemetry  -consulted cardio recs   - Echocardiogram noted normal biventricular systolic function, mild diastolic dysfunction, LVH  - s/p LHC on 6/8 which showed no lesions to be revascularized, patent grafts   - continued on metoprolol XL 75 mg   -Continued on Ranolazine.       #Diabetes  -Hgb A1c 8.1  -Checked Fingersticks with Meals and covered with ISS TID.     #Chronic kidney disease (CKD)  -Creat 1.42, GFR 55%, monitored on BMP  -UA neg.     # Hypertension  -continued on home meds       #Hyperlipemia  -continued on Home meds    Patient seen and evaluated. Reviewed discharge medications with patient and attending. All new medications requiring new prescriptions were sent to the pharmacy of patient's choice. Reviewed need for prescription for previous home medications and new prescriptions sent if requested. Medically cleared/stable for discharge as per  with appropriate follow up. Patient understands and agrees with plan of care.

## 2021-06-09 NOTE — PROGRESS NOTE ADULT - ASSESSMENT
61 M complex CAD, current smoker, w/ HTN, HLD, type II DM, CAD s/p CABG (2003), with recent cardiac cath on 6/2/21 w/ unsuccessful PCI to [facility] [employer] , presented for evaluation of angina. Troponin remains negative. Patient has had no further episodes of CP since admission.     Echocardiogram noted normal biventricular systolic function, mild diastolic dysfunction, LVH    LHC at [facility] on 6/8/21 revealed no lesions that can be revascularized -- LCx was a small vessel.  Patent grafts.   Recommend continuing with medical management.      Plan:   Continue aspirin, atorvastatin 80, Toprol XL 75  Continue Ranolazine   Continue amlodipine and valsartan for HTN  Patient may be discharged to home today.  Can f/u with us in the office in 2-4 weeks.

## 2021-06-09 NOTE — PROGRESS NOTE ADULT - PROBLEM SELECTOR PROBLEM 3
Chronic kidney disease (CKD)

## 2021-06-09 NOTE — PROGRESS NOTE ADULT - SUBJECTIVE AND OBJECTIVE BOX
[facility] Division of Hospital Medicine  Faith Shukla MD  Pager (M-F, 8A-5P): [phone]  Other Times:  [id number]    Patient is a 61y old  Male who presents with a chief complaint of dizziness (09 Jun 2021 06:20)      SUBJECTIVE / OVERNIGHT EVENTS:      MEDICATIONS  (STANDING):  amLODIPine   Tablet 10 milliGRAM(s) Oral daily  artificial  tears Solution 1 Drop(s) Both EYES four times a day  aspirin enteric coated 81 milliGRAM(s) Oral daily  atorvastatin 80 milliGRAM(s) Oral at bedtime  dextrose 40% Gel 15 Gram(s) Oral once  dextrose 5%. 1000 milliLiter(s) (50 mL/Hr) IV Continuous <Continuous>  dextrose 5%. 1000 milliLiter(s) (100 mL/Hr) IV Continuous <Continuous>  dextrose 50% Injectable 25 Gram(s) IV Push once  dextrose 50% Injectable 12.5 Gram(s) IV Push once  dextrose 50% Injectable 25 Gram(s) IV Push once  enoxaparin Injectable 40 milliGRAM(s) SubCutaneous daily  glucagon  Injectable 1 milliGRAM(s) IntraMuscular once  insulin lispro (ADMELOG) corrective regimen sliding scale   SubCutaneous three times a day before meals  metoprolol succinate ER 75 milliGRAM(s) Oral daily  nicotine - 21 mG/24Hr(s) Patch 1 patch Transdermal daily  ranolazine 500 milliGRAM(s) Oral two times a day  sodium chloride 0.9%. 500 milliLiter(s) (150 mL/Hr) IV Continuous <Continuous>  valsartan 80 milliGRAM(s) Oral daily    MEDICATIONS  (PRN):      CAPILLARY BLOOD GLUCOSE      POCT Blood Glucose.: 150 mg/dL (09 Jun 2021 08:27)  POCT Blood Glucose.: 155 mg/dL (08 Jun 2021 21:13)  POCT Blood Glucose.: 270 mg/dL (08 Jun 2021 11:14)    I&O's Summary      PHYSICAL EXAM:  Vital Signs Last 24 Hrs  T(C): 36.8 (09 Jun 2021 05:20), Max: 36.8 (09 Jun 2021 05:20)  T(F): 98.2 (09 Jun 2021 05:20), Max: 98.2 (09 Jun 2021 05:20)  HR: 63 (09 Jun 2021 05:20) (60 - 69)  BP: 140/71 (09 Jun 2021 05:20) (115/62 - 140/71)  BP(mean): --  RR: 16 (09 Jun 2021 05:20) (15 - 18)  SpO2: 100% (09 Jun 2021 05:20) (100% - 100%)    CONSTITUTIONAL: NAD, well-developed, well-groomed  EYES: EOMI; conjunctiva and sclera clear  ENMT: Moist oral mucosa  RESPIRATORY: Normal respiratory effort; lungs are clear to auscultation bilaterally  CARDIOVASCULAR: Regular rate and rhythm, normal S1 and S2, no murmur; No lower extremity edema  ABDOMEN: Nontender to palpation, normoactive bowel sounds, no rebound/guarding  MUSCULOSKELETAL:   no clubbing or cyanosis of digits; no joint swelling or tenderness to palpation  PSYCH: A+O to person, place, and time; affect appropriate  NEUROLOGY: CN 2-12 are intact and symmetric; no gross sensory deficits   SKIN: No rashes; no palpable lesions    LABS:                        12.6   8.87  )-----------( 265      ( 09 Jun 2021 08:07 )             39.1     06-09    133<L>  |  100  |  25<H>  ----------------------------<  144<H>  4.5   |  19<L>  |  1.69<H>    Ca    9.2      09 Jun 2021 08:07  Phos  5.0     06-09  Mg     2.1     06-09                RADIOLOGY & ADDITIONAL TESTS:  Results Reviewed:   Imaging Personally Reviewed:  Electrocardiogram Personally Reviewed:    COORDINATION OF CARE:  Care Discussed with Consultants/Other Providers [Y/N]: Y  Prior or Outpatient Records Reviewed [Y/N]: Y   [facility] Division of Hospital Medicine  Faith Shukla MD  Pager (M-F, 8A-5P): [phone]  Other Times:  [id number]    Patient is a 61y old  Male who presents with a chief complaint of dizziness (09 Jun 2021 06:20)      SUBJECTIVE / OVERNIGHT EVENTS:  denies chest pain or sob.      MEDICATIONS  (STANDING):  amLODIPine   Tablet 10 milliGRAM(s) Oral daily  artificial  tears Solution 1 Drop(s) Both EYES four times a day  aspirin enteric coated 81 milliGRAM(s) Oral daily  atorvastatin 80 milliGRAM(s) Oral at bedtime  dextrose 40% Gel 15 Gram(s) Oral once  dextrose 5%. 1000 milliLiter(s) (50 mL/Hr) IV Continuous <Continuous>  dextrose 5%. 1000 milliLiter(s) (100 mL/Hr) IV Continuous <Continuous>  dextrose 50% Injectable 25 Gram(s) IV Push once  dextrose 50% Injectable 12.5 Gram(s) IV Push once  dextrose 50% Injectable 25 Gram(s) IV Push once  enoxaparin Injectable 40 milliGRAM(s) SubCutaneous daily  glucagon  Injectable 1 milliGRAM(s) IntraMuscular once  insulin lispro (ADMELOG) corrective regimen sliding scale   SubCutaneous three times a day before meals  metoprolol succinate ER 75 milliGRAM(s) Oral daily  nicotine - 21 mG/24Hr(s) Patch 1 patch Transdermal daily  ranolazine 500 milliGRAM(s) Oral two times a day  sodium chloride 0.9%. 500 milliLiter(s) (150 mL/Hr) IV Continuous <Continuous>  valsartan 80 milliGRAM(s) Oral daily    MEDICATIONS  (PRN):      CAPILLARY BLOOD GLUCOSE      POCT Blood Glucose.: 150 mg/dL (09 Jun 2021 08:27)  POCT Blood Glucose.: 155 mg/dL (08 Jun 2021 21:13)  POCT Blood Glucose.: 270 mg/dL (08 Jun 2021 11:14)    I&O's Summary      PHYSICAL EXAM:  Vital Signs Last 24 Hrs  T(C): 36.8 (09 Jun 2021 05:20), Max: 36.8 (09 Jun 2021 05:20)  T(F): 98.2 (09 Jun 2021 05:20), Max: 98.2 (09 Jun 2021 05:20)  HR: 63 (09 Jun 2021 05:20) (60 - 69)  BP: 140/71 (09 Jun 2021 05:20) (115/62 - 140/71)  BP(mean): --  RR: 16 (09 Jun 2021 05:20) (15 - 18)  SpO2: 100% (09 Jun 2021 05:20) (100% - 100%)    CONSTITUTIONAL: NAD, well-developed, well-groomed  EYES: EOMI; conjunctiva and sclera clear  ENMT: Moist oral mucosa  RESPIRATORY: Normal respiratory effort; lungs are clear to auscultation bilaterally  CARDIOVASCULAR: Regular rate and rhythm, normal S1 and S2, no murmur; No lower extremity edema  ABDOMEN: Nontender to palpation, normoactive bowel sounds, no rebound/guarding  MUSCULOSKELETAL:   no clubbing or cyanosis of digits; no joint swelling or tenderness to palpation  PSYCH: A+O to person, place, and time; affect appropriate  NEUROLOGY: CN 2-12 are intact and symmetric; no gross sensory deficits   SKIN: No rashes; no palpable lesions    LABS:                        12.6   8.87  )-----------( 265      ( 09 Jun 2021 08:07 )             39.1     06-09    133<L>  |  100  |  25<H>  ----------------------------<  144<H>  4.5   |  19<L>  |  1.69<H>    Ca    9.2      09 Jun 2021 08:07  Phos  5.0     06-09  Mg     2.1     06-09                RADIOLOGY & ADDITIONAL TESTS:  Results Reviewed:   Imaging Personally Reviewed:  Electrocardiogram Personally Reviewed:    COORDINATION OF CARE:  Care Discussed with Consultants/Other Providers [Y/N]: Y  Prior or Outpatient Records Reviewed [Y/N]: Y

## 2021-06-09 NOTE — PROGRESS NOTE ADULT - SUBJECTIVE AND OBJECTIVE BOX
Cardiology Attending Progress Note    [facility] revealed no lesions that can be revascularized -- LCx was a small vessel.  Patent grafts.   Recommend continuing with medical management.    Patient may be discharged to home today.  Can f/u with us in the office in 2-4 weeks.      Telemetry: SR 60s bpm, no events    Vital Signs Last 24 Hrs  T(C): 36.8 (2021 05:20), Max: 36.8 (2021 05:20)  T(F): 98.2 (2021 05:20), Max: 98.2 (2021 05:20)  HR: 63 (2021 05:20) (60 - 69)  BP: 140/71 (2021 05:20) (115/62 - 140/71)  BP(mean): --  RR: 16 (2021 05:20) (15 - 18)  SpO2: 100% (2021 05:20) (100% - 100%)    I&O's Summary  None recorded    GENERAL: NAD  HEENT: No JVD  CHEST/LUNG: Clear to auscultation bilaterally  HEART: Regular rate and rhythm; No murmurs  EXTREMITIES:  No edema    MEDICATIONS  (STANDING):  amLODIPine   Tablet 10 milliGRAM(s) Oral daily  artificial  tears Solution 1 Drop(s) Both EYES four times a day  aspirin enteric coated 81 milliGRAM(s) Oral daily  atorvastatin 80 milliGRAM(s) Oral at bedtime  dextrose 40% Gel 15 Gram(s) Oral once  dextrose 5%. 1000 milliLiter(s) (50 mL/Hr) IV Continuous <Continuous>  dextrose 5%. 1000 milliLiter(s) (100 mL/Hr) IV Continuous <Continuous>  dextrose 50% Injectable 25 Gram(s) IV Push once  dextrose 50% Injectable 12.5 Gram(s) IV Push once  dextrose 50% Injectable 25 Gram(s) IV Push once  enoxaparin Injectable 40 milliGRAM(s) SubCutaneous daily  glucagon  Injectable 1 milliGRAM(s) IntraMuscular once  insulin lispro (ADMELOG) corrective regimen sliding scale   SubCutaneous three times a day before meals  metoprolol succinate ER 75 milliGRAM(s) Oral daily  nicotine - 21 mG/24Hr(s) Patch 1 patch Transdermal daily  ranolazine 500 milliGRAM(s) Oral two times a day  valsartan 80 milliGRAM(s) Oral daily      LABS:                          12.6   8.83  )-----------( 264      ( 2021 05:00 )             39.0   06-08    135  |  101  |  24<H>  ----------------------------<  142<H>  4.5   |  19<L>  |  1.58<H>    Ca    9.2      2021 05:00  Phos  4.7     06-07  Mg     2.2     06-08    PT/INR - ( 2021 07:15 )   PT: 11.4 sec;   INR: 0.99 ratio    PTT - ( 2021 07:15 )  PTT:33.1 sec      < from: Xray Chest 1 View- PORTABLE-Urgent (21 @ 03:10) >    EXAM:  XR CHEST PORTABLE URGENT 1V        PROCEDURE DATE:  2021       INTERPRETATION:  CLINICAL INFORMATION: Chest pain.    TECHNIQUE: Frontal radiograph of the chest.    COMPARISON: Chest x-ray 2016.    IMPRESSION:  Clear lungs. No pleural effusions or pneumothorax.    Sternal wires and multiple left mediastinal surgical clips present. Slightly prominent appearing cardiac silhouette however inaccurately assessed on the projection.    Trachea midline.    Unremarkable osseous structures.            DANNY ROMERO MD; Resident Radiology  This document has been electronically signed.  ARLINE BRAGG MD; Attending Radiologist  This document has been electronically signed. 2021 10:23AM    < end of copied text >  < from: Cardiac Cath Lab - Adult (21 @ 11:18) >          Study Date:     2021     Name:           JEAN BUTLER     :            1960     Gender:         male     MR#:            [id number]     [facility]#:           [id number]     Patient Class:  Outpatient         Cath Lab Report      Diagnostic Cardiologist:       Ray Albrecht MD     Circulator:                    Rosendo Torres RN     Monitor:                       Karen Taylor RN     Scrub:                         Shelton Jacques     Referring Physician:           Ray Albrecht MD     Fellow:                        Gulshan Lorenz MD     Circulator:                    Ruba French RN     Diagnostic Cardiologist:       Johnnie Lentz MD     Diagnostic Cardiologist:       Johnnie Lentz MD         Procedures Performed     Procedures:           1.    Art Access - L ulnar artery         2.    Left Coronary Angio     3.    Left Heart Cath without Ventriculogram     4.    Right Coronary Angio         Indications:                CCS Class III     Lab Visit Indication:      new onset angina (less than or equal to 2    months)    PCI Status:                elective         Diagnostic Conclusions:         1. s/p CABG with the following bypass anatomy:     a) Patent LIMA-LAD.      b) Patent SVG to the RCA (this SVG was divided in themid-portion with    one arm that feeds the R PDA and another arm that    feeds the R PL.      c) Patent freed left radial to the diagonal.          2. Catheterization reveals ischemic LCx territory with severe highly    calcified 90-95% prox-LCx stenosis; this LCx territory is not    fed by bypass grafts. The LAD and RCA territory are adequately    supplied.    3. On this cathererization, there was an attempt to PCI the prox-LCx,    however, there was poor guide support via left radial using    6Fr EBU 3.5.      4. Will need to consider re-attempt via femoral access (or right    radial) with 7 Fr EBU 3.75 guide. May need    shockwave/atherectomy due to calcification.     5. Left ulnar access (hx of left radial artery harvest for bypass).             Acute complication:    No complications         Procedure Narrative:         Patient: JEAN BUTLER          MRN: [id number]    Study Date: 2021   11:18 AM      Page 1 of 4      The risks and alternatives of the procedures and conscious sedation    were explained to the patient and informed consent was    obtained. The patient was brought to the cath lab and placed on the    table.        Diagnostic Findings:         Coronary Angiography     The coronary circulation is right dominant.          LM     Left main artery: Mild CAD..          LAD     Left anterior descending artery: Mid-occluded, but fortunately fed by    patent LIMA-LAD and radial graft to the diagonal. .        CX     Circumflex: highly calcified 90-95% prox-LCx stenosis; this LCx    territory is not fed by bypass grafts.        RCA     Right coronary artery: Not engaged, but likely occuded but fortunately    has patent SVG to this territory..        Clinical Data:     Hypertension:               Yes     Dyslipidemia:                                Yes     Prior MI:                                    No     Prior PCI:                                   No     Family History of Premature CAD:             Yes     Cerebrovascular Disease:                     No     Peripheral Arterial Disease:                 Yes     Prior CABG:                                  Yes     Tobacco Use:                                 Never     Cardiac Arrest Out of Hospital:              No     Cardiac Arrest at Transferring Facility:     No     Prior Heart Failure:                         No     Diabetes:                                    No     CSHA ([place] Study of Health and Aging)    5 - Mildly Frail     Frailty Scale:     Radiation Summary:     Total Dose Area Product:            239798 mGycm2     Total Air Kerma:                    1024 mGy     Total Fluoro Time:                  30.4 min         Exam Start Time:   11:18 AM     Exam End Time:     12:29 PM     Exam Duration:     71min             Electronically signed by Ray Albrecht MD on 2021 at 06:23 AM     (No Signature Object)         Patient: JEAN BUTLER          MRN: [id number]    Study Date: 2021   11:18 AM      Page 4 of 4    < from: Transthoracic Echocardiogram (21 @ 18:21) >    Patient name: JEAN BUTLER  YOB: 1960   Age: 61 (M)   MR#: [id number]  Study Date: 2021  [facility]CS  Study quality: Technically good  Referring Physician: Popeye Banks MD  Blood Pressure: 113/60 mmHg  Height: 172 cm  Weight: 70 kg  BSA: 1.8 m2  ------------------------------------------------------------------------  PROCEDURE: Transthoracic echocardiogram with 2-D, M-Mode  and complete spectral and color flow Doppler.  INDICATION: Chest pain, unspecified (R07.9)  ------------------------------------------------------------------------  DIMENSIONS:  Dimensions:     Normal Values:  LA:     3.3 cm    2.0 - 4.0 cm  Ao:     3.4 cm    2.0 - 3.8 cm  SEPTUM: 1.4 cm    0.6 - 1.2 cm  PWT:1.4 cm    0.6 - 1.1 cm  LVIDd:  4.6 cm    3.0 - 5.6 cm  LVIDs:  3.0 cm    1.8 - 4.0 cm  Derived Variables:  LVMI: 140 g/m2  RWT: 0.60  Fractional short: 35 %  Ejection Fraction (Teicholtz): 64 %  ------------------------------------------------------------------------  OBSERVATIONS:  Mitral Valve: Normal mitral valve. Minimal mitral  regurgitation.  Aortic Root: Normal aortic root.  Aortic Valve: Aortic valve leaflet morphology not well  visualized.  Left Atrium: Normal left atrium.  LA volume index = 21  cc/m2.  Left Ventricle: Normal left ventricular systolic function.  No segmental wall motion abnormalities. Moderate concentric  left ventricular hypertrophy. Mild diastolic dysfunction  (Stage I).  Right Heart: Normal right atrium. Normal right ventricular  size and function. Normal tricuspid valve. Minimal  tricuspid regurgitation. Normal pulmonic valve.  Pericardium/PleuraNormal pericardium with no pericardial  effusion.  ------------------------------------------------------------------------  CONCLUSIONS:  1. Normal mitral valve. Minimal mitral regurgitation.  2. Moderate concentric left ventricular hypertrophy.  3. Normal left ventricular systolic function. No segmental  wall motion abnormalities.  4. Mild diastolic dysfunction(Stage I).  5. Normal right ventricular size and function.  ------------------------------------------------------------------------  Confirmed on  2021 - 19:36:52 by Austyn Geiger M.D.,  [facility], [place]  ------------------------------------------------------------------------    < end of copied text >     Cardiology Attending Progress Note    [facility] revealed no lesions that can be revascularized -- LCx was a small vessel.  Patent grafts.   Recommend continuing with medical management.    Patient may be discharged to home today.  Can f/u with us in the office in 2-4 weeks.    Telemetry: SR 60s bpm, no events    Vital Signs Last 24 Hrs  T(C): 36.8 (2021 05:20), Max: 36.8 (2021 05:20)  T(F): 98.2 (2021 05:20), Max: 98.2 (2021 05:20)  HR: 63 (2021 05:20) (60 - 69)  BP: 140/71 (2021 05:20) (115/62 - 140/71)  BP(mean): --  RR: 16 (2021 05:20) (15 - 18)  SpO2: 100% (2021 05:20) (100% - 100%)    I&O's Summary  None recorded    GENERAL: NAD  HEENT: No JVD  CHEST/LUNG: Clear to auscultation bilaterally  HEART: Regular rate and rhythm; No murmurs  EXTREMITIES:  No edema    MEDICATIONS  (STANDING):  amLODIPine   Tablet 10 milliGRAM(s) Oral daily  artificial  tears Solution 1 Drop(s) Both EYES four times a day  aspirin enteric coated 81 milliGRAM(s) Oral daily  atorvastatin 80 milliGRAM(s) Oral at bedtime  dextrose 40% Gel 15 Gram(s) Oral once  dextrose 5%. 1000 milliLiter(s) (50 mL/Hr) IV Continuous <Continuous>  dextrose 5%. 1000 milliLiter(s) (100 mL/Hr) IV Continuous <Continuous>  dextrose 50% Injectable 25 Gram(s) IV Push once  dextrose 50% Injectable 12.5 Gram(s) IV Push once  dextrose 50% Injectable 25 Gram(s) IV Push once  enoxaparin Injectable 40 milliGRAM(s) SubCutaneous daily  glucagon  Injectable 1 milliGRAM(s) IntraMuscular once  insulin lispro (ADMELOG) corrective regimen sliding scale   SubCutaneous three times a day before meals  metoprolol succinate ER 75 milliGRAM(s) Oral daily  nicotine - 21 mG/24Hr(s) Patch 1 patch Transdermal daily  ranolazine 500 milliGRAM(s) Oral two times a day  sodium chloride 0.9%. 500 milliLiter(s) (150 mL/Hr) IV Continuous <Continuous>  valsartan 80 milliGRAM(s) Oral daily      LABS:                                   12.6   8.83  )-----------( 264      ( 2021 05:00 )             39.0   06-08    135  |  101  |  24<H>  ----------------------------<  142<H>  4.5   |  19<L>  |  1.58<H>    Ca    9.2      2021 05:00  Phos  4.7     06-07  Mg     2.2     06-08    PT/INR - ( 2021 07:15 )   PT: 11.4 sec;   INR: 0.99 ratio    PTT - ( 2021 07:15 )  PTT:33.1 sec               < from: Xray Chest 1 View- PORTABLE-Urgent (21 @ 03:10) >    EXAM:  XR CHEST PORTABLE URGENT 1V        PROCEDURE DATE:  2021       INTERPRETATION:  CLINICAL INFORMATION: Chest pain.    TECHNIQUE: Frontal radiograph of the chest.    COMPARISON: Chest x-ray 2016.    IMPRESSION:  Clear lungs. No pleural effusions or pneumothorax.    Sternal wires and multiple left mediastinal surgical clips present. Slightly prominent appearing cardiac silhouette however inaccurately assessed on the projection.    Trachea midline.    Unremarkable osseous structures.      DANNY ROMERO MD; Resident Radiology  This document has been electronically signed.  ARLINE BRAGG MD; Attending Radiologist  This document has been electronically signed. 2021 10:23AM    < end of copied text >  < from: Cardiac Cath Lab - Adult (21 @ 11:18) >          Study Date:     2021     Name:           JEAN BUTLER     :            1960     Gender:         male     MR#:            [id number]     [facility]#:           [id number]     Patient Class:  Outpatient         Cath Lab Report      Diagnostic Cardiologist:       Ray Albrecht MD     Circulator:                    Rosendo Torres RN     Monitor:                       Karen Taylor RN     Scrub:                         Shelton Jacques     Referring Physician:           Ray Albrecht MD     Fellow:                        Gulshan Lorenz MD     Circulator:                    Ruba French RN     Diagnostic Cardiologist:       Johnnie Lentz MD     Diagnostic Cardiologist:       Johnnie Lentz MD         Procedures Performed     Procedures:           1.    Art Access - L ulnar artery         2.    Left Coronary Angio     3.    Left Heart Cath without Ventriculogram     4.    Right Coronary Angio         Indications:                CCS Class III     Lab Visit Indication:      new onset angina (less than or equal to 2    months)    PCI Status:                elective         Diagnostic Conclusions:         1. s/p CABG with the following bypass anatomy:     a) Patent LIMA-LAD.      b) Patent SVG to the RCA (this SVG was divided in themid-portion with    one arm that feeds the R PDA and another arm that    feeds the R PL.      c) Patent freed left radial to the diagonal.          2. Catheterization reveals ischemic LCx territory with severe highly    calcified 90-95% prox-LCx stenosis; this LCx territory is not    fed by bypass grafts. The LAD and RCA territory are adequately    supplied.    3. On this cathererization, there was an attempt to PCI the prox-LCx,    however, there was poor guide support via left radial using    6Fr EBU 3.5.      4. Will need to consider re-attempt via femoral access (or right    radial) with 7 Fr EBU 3.75 guide. May need    shockwave/atherectomy due to calcification.     5. Left ulnar access (hx of left radial artery harvest for bypass).             Acute complication:    No complications         Procedure Narrative:         Patient: JEAN BUTLER          MRN: [id number]    Study Date: 2021   11:18 AM      Page 1 of 4      The risks and alternatives of the procedures and conscious sedation    were explained to the patient and informed consent was    obtained. The patient was brought to the cath lab and placed on the    table.        Diagnostic Findings:         Coronary Angiography     The coronary circulation is right dominant.          LM     Left main artery: Mild CAD..          LAD     Left anterior descending artery: Mid-occluded, but fortunately fed by    patent LIMA-LAD and radial graft to the diagonal. .        CX     Circumflex: highly calcified 90-95% prox-LCx stenosis; this LCx    territory is not fed by bypass grafts.        RCA     Right coronary artery: Not engaged, but likely occuded but fortunately    has patent SVG to this territory..        Clinical Data:     Hypertension:               Yes     Dyslipidemia:                                Yes     Prior MI:                                    No     Prior PCI:                                   No     Family History of Premature CAD:             Yes     Cerebrovascular Disease:                     No     Peripheral Arterial Disease:                 Yes     Prior CABG:                                  Yes     Tobacco Use:                                 Never     Cardiac Arrest Out of Hospital:              No     Cardiac Arrest at Transferring Facility:     No     Prior Heart Failure:                         No     Diabetes:                                    No     CSHA ([place] Study of Health and Aging)    5 - Mildly Frail     Frailty Scale:     Radiation Summary:     Total Dose Area Product:            519546 mGycm2     Total Air Kerma:                    1024 mGy     Total Fluoro Time:                  30.4 min         Exam Start Time:   11:18 AM     Exam End Time:     12:29 PM     Exam Duration:     71min             Electronically signed by Ray Albrecht MD on 2021 at 06:23 AM     (No Signature Object)         Patient: JEAN BUTLER          MRN: [id number]    Study Date: 2021   11:18 AM      Page 4 of 4    < from: Transthoracic Echocardiogram (21 @ 18:21) >    Patient name: JEAN BUTLER  YOB: 1960   Age: 61 (M)   MR#: [id number]  Study Date: 2021  [facility] RDCS  Study quality: Technically good  Referring Physician: Popeye Banks MD  Blood Pressure: 113/60 mmHg  Height: 172 cm  Weight: 70 kg  BSA: 1.8 m2  ------------------------------------------------------------------------  PROCEDURE: Transthoracic echocardiogram with 2-D, M-Mode  and complete spectral and color flow Doppler.  INDICATION: Chest pain, unspecified (R07.9)  ------------------------------------------------------------------------  DIMENSIONS:  Dimensions:     Normal Values:  LA:     3.3 cm    2.0 - 4.0 cm  Ao:     3.4 cm    2.0 - 3.8 cm  SEPTUM: 1.4 cm    0.6 - 1.2 cm  PWT:1.4 cm    0.6 - 1.1 cm  LVIDd:  4.6 cm    3.0 - 5.6 cm  LVIDs:  3.0 cm    1.8 - 4.0 cm  Derived Variables:  LVMI: 140 g/m2  RWT: 0.60  Fractional short: 35 %  Ejection Fraction (Teicholtz): 64 %  ------------------------------------------------------------------------  OBSERVATIONS:  Mitral Valve: Normal mitral valve. Minimal mitral  regurgitation.  Aortic Root: Normal aortic root.  Aortic Valve: Aortic valve leaflet morphology not well  visualized.  Left Atrium: Normal left atrium.  LA volume index = 21  cc/m2.  Left Ventricle: Normal left ventricular systolic function.  No segmental wall motion abnormalities. Moderate concentric  left ventricular hypertrophy. Mild diastolic dysfunction  (Stage I).  Right Heart: Normal right atrium. Normal right ventricular  size and function. Normal tricuspid valve. Minimal  tricuspid regurgitation. Normal pulmonic valve.  Pericardium/PleuraNormal pericardium with no pericardial  effusion.  ------------------------------------------------------------------------  CONCLUSIONS:  1. Normal mitral valve. Minimal mitral regurgitation.  2. Moderate concentric left ventricular hypertrophy.  3. Normal left ventricular systolic function. No segmental  wall motion abnormalities.  4. Mild diastolic dysfunction(Stage I).  5. Normal right ventricular size and function.  ------------------------------------------------------------------------  Confirmed on  2021 - 19:36:52 by Austyn Geiger M.D.,      [facility], [place]  ------------------------------------------------------------------------    < end of copied text >

## 2021-06-09 NOTE — DISCHARGE NOTE PROVIDER - NSDCMRMEDTOKEN_GEN_ALL_CORE_FT
amLODIPine 10 mg oral tablet: 1 tab(s) orally once a day  Aspirin Enteric Coated 81 mg oral delayed release tablet: 1 tab(s) orally once a day  atorvastatin 80 mg oral tablet: 1 tab(s) orally once a day  Lubricant Eye Drops preserved ophthalmic solution: 1 drop(s) to each affected eye 4 times a day  meloxicam 15 mg oral tablet: 1 tab(s) orally once a day, As Needed  metFORMIN 850 mg oral tablet: 1 tab(s) orally once a day (in the morning)  metoprolol succinate 25 mg oral tablet, extended release: 3 tab(s) orally once a day  nicotine 21 mg-14 mg-7 mg transdermal film, extended release: 1 each transdermal once a day  nitroglycerin 0.4 mg/hr transdermal film, extended release: patch transdermal once a day, As Needed  ranolazine 500 mg oral tablet, extended release: 1 tab(s) orally 2 times a day  valsartan 80 mg oral tablet: 1 tab(s) orally once a day

## 2021-06-09 NOTE — DISCHARGE NOTE NURSING/CASE MANAGEMENT/SOCIAL WORK - NSDCPEEMAIL_GEN_ALL_CORE
[facility] email [email or web address]

## 2021-06-09 NOTE — PROGRESS NOTE ADULT - PROBLEM SELECTOR PLAN 4
Continue amlodipine and valsartan for HTN

## 2021-10-08 NOTE — H&P ADULT - PROBLEM SELECTOR PLAN 4
Medication(s) Requested: alprazolam    Rx for 30 days    Last appt: 8/23/21    NOS/late cancellations in last yr: 0    Last dispense per PDMP: 4/23/21    Is the patient due for refill of this medication(s): Yes    PDMP review: Criteria met. Forwarded to Physician/ANGELLA for signature.      Next follow up appt: 12/20/21     continue home meds

## 2022-07-07 NOTE — DISCHARGE NOTE NURSING/CASE MANAGEMENT/SOCIAL WORK - NURSING SECTION COMPLETE
· Crest syndrome with pulmonary hypertension  · Continue supplemental oxygen as needed  · Sildenafil  · Would be appropriate for palliative care  · Family meeting on June 23--> continue medical directed care but DNR level established   · Will need ongoing conversations about realistic expectations and prognosis Patient/Caregiver provided printed discharge information.

## 2022-10-11 NOTE — H&P ADULT - NSICDXPASTSURGICALHX_GEN_ALL_CORE_FT
DISPLAY PLAN FREE TEXT DISPLAY PLAN FREE TEXT DISPLAY PLAN FREE TEXT DISPLAY PLAN FREE TEXT DISPLAY PLAN FREE TEXT DISPLAY PLAN FREE TEXT DISPLAY PLAN FREE TEXT DISPLAY PLAN FREE TEXT PAST SURGICAL HISTORY:  S/P CABG (coronary artery bypass graft) in 2003 in [place]

## 2023-03-22 NOTE — PATIENT PROFILE ADULT - NSPROEXTENSIONSOFSELF_GEN_A_NUR
eyeglasses none Doxycycline Pregnancy And Lactation Text: This medication is Pregnancy Category D and not consider safe during pregnancy. It is also excreted in breast milk but is considered safe for shorter treatment courses.

## 2024-09-04 PROBLEM — N18.9 CHRONIC KIDNEY DISEASE, UNSPECIFIED: Chronic | Status: ACTIVE | Noted: 2021-06-05

## 2024-09-11 ENCOUNTER — APPOINTMENT (OUTPATIENT)
Dept: CARDIOLOGY | Facility: HOSPITAL | Age: 64
End: 2024-09-11

## 2024-09-11 VITALS
HEIGHT: 69 IN | BODY MASS INDEX: 25.18 KG/M2 | HEART RATE: 65 BPM | DIASTOLIC BLOOD PRESSURE: 61 MMHG | WEIGHT: 170 LBS | OXYGEN SATURATION: 98 % | SYSTOLIC BLOOD PRESSURE: 102 MMHG

## 2024-09-11 DIAGNOSIS — I20.89 OTHER FORMS OF ANGINA PECTORIS: ICD-10-CM

## 2024-09-11 DIAGNOSIS — E11.51 TYPE 2 DIABETES MELLITUS WITH DIABETIC PERIPHERAL ANGIOPATHY W/OUT GANGRENE: ICD-10-CM

## 2024-09-11 DIAGNOSIS — F17.200 NICOTINE DEPENDENCE, UNSPECIFIED, UNCOMPLICATED: ICD-10-CM

## 2024-09-11 DIAGNOSIS — I25.118 ATHEROSCLEROTIC HEART DISEASE OF NATIVE CORONARY ARTERY WITH OTHER FORMS OF ANGINA PECTORIS: ICD-10-CM

## 2024-09-11 PROBLEM — Z00.00 ENCOUNTER FOR PREVENTIVE HEALTH EXAMINATION: Status: ACTIVE | Noted: 2024-09-11

## 2024-09-11 RX ORDER — NICOTINE 21-14-7MG
21-14-7 KIT TRANSDERMAL
Qty: 1 | Refills: 0 | Status: ACTIVE | COMMUNITY
Start: 2024-09-11 | End: 1900-01-01

## 2024-09-12 PROBLEM — I20.89 EXERTIONAL ANGINA: Status: ACTIVE | Noted: 2024-09-11

## 2024-09-12 NOTE — ASSESSMENT
[FreeTextEntry1] : Mr. Shi is a 64 year old Male current smoker, w/ HTN, HLD, type II DM, CAD s/p CABG (2003), with recent cardiac cath on 6/2/21 w/ unsuccessful PCI to pLCx , LHC at [facility] on 6/8/21 revealed no lesions that can be revascularized -- LCx was a small vessel. Patent grafts. TTE from 06/2021 showed moderate concentric LVH, normal LV systolic function, no WMAs, mild diastolic dysfunction.  #New, Likely Untable Angina with history of CABG and Tobacco Use Disorder Patient with typical angina, seems unstable given new onset over past 3 months, possibly worsening, and with history of CABG and continued tobacco use. Discussed with attending regarding stress testing vs cath however for above reasons, will send for LHC for evaluation.  - recommended that patient call the clinic once he has a list of all his medications, as he may possibly already be on a long acting nitrate (which I would like to consider if he is not).  - referral for LHC - TTE - tobacco cessation discussed at length, offered 2 forms however he would like to do patches. Offered the 21 mg taper and sent to pharmacy - will get A1C, lipid profile, CBC, BMP, coags for LHC, UA and urine microalbumin

## 2024-09-12 NOTE — ASSESSMENT
[FreeTextEntry1] : Mr. Shi is a 64 year old Male current smoker, w/ HTN, HLD, type II DM, CAD s/p CABG (2003), with recent cardiac cath on 6/2/21 w/ unsuccessful PCI to pLCx , LHC at [facility] on 6/8/21 revealed no lesions that can be revascularized -- LCx was a small vessel. Patent grafts. TTE from 06/2021 showed moderate concentric LVH, normal LV systolic function, no WMAs, mild diastolic dysfunction.  #New, Likely Untable Angina with history of CABG and Tobacco Use Disorder Patient with typical angina, seems unstable given new onset over past 3 months, possibly worsening, and with history of CABG and continued tobacco use. Discussed with attending regarding stress testing vs cath however for above reasons, will send for LHC for evaluation.  - recommended that patient call the clinic once he has a list of all his medications, as he may possibly already be on a long acting nitrate (which I would like to consider if he is not).  - referral for LHC - TTE - tobacco cessation discussed at length, offered 2 forms however he would like to do patches. Offered the 21 mg taper and sent to pharmacy - will get A1C, lipid profile, CBC, BMP, coags for LHC, UA and urine microalbumin

## 2024-09-12 NOTE — END OF VISIT
[FreeTextEntry3] : Latanya Shi is a 64-year-old man with a complex medical history, including hypertension (HTN), hyperlipidemia (HLD), type II diabetes mellitus (DM), and coronary artery disease (CAD) status post coronary artery bypass grafting (CABG) in 2003. Cardiac catheterization was done on June 2, 2021, which included an unsuccessful percutaneous coronary intervention (PCI) to the proximal left circumflex (pLCx) chronic total occlusion (). A left heart catheterization (LHC) on June 8, 2021, revealed no revascularizable lesions, and the left circumflex (LCx) was noted to be a small vessel with patent grafts. A transthoracic echocardiogram (TTE) from June 2021 showed moderate concentric left ventricular hypertrophy (LVH), normal left ventricular systolic function, no wall motion abnormalities (WMAs), and mild diastolic dysfunction. The patient is experiencing new, likely unstable angina, which has worsened over the past three months. Given his history of CABG and continued tobacco use, further evaluation with LHC is recommended Current management plan includes a referral for LHC and  TTE. Fifteen minutes were devoted to smoking cessation. The patient was counseled on current 7 cigarettes per day dependence and the effects of smoking on the patient's health / comorbidities, family and friends (emotional and secondhand smoke) as well as finances. Options for pharmacotherapy were proved, including nicotine patches and nicotine gum. The patient was also informed o option to call 0-667-PASongAfter, text [phone] or visit www.VtagO for further information, an online quit , or [id number] (6-week step-by-step text messaging program). Fter this discussion, the patient opted for nicotine patches. Additionally, various lab tests (A1C, lipid profile, CBC, BMP, coags, UA, and urine microalbumin) are planned

## 2024-09-12 NOTE — END OF VISIT
[FreeTextEntry3] : Latanya Shi is a 64-year-old man with a complex medical history, including hypertension (HTN), hyperlipidemia (HLD), type II diabetes mellitus (DM), and coronary artery disease (CAD) status post coronary artery bypass grafting (CABG) in 2003. Cardiac catheterization was done on June 2, 2021, which included an unsuccessful percutaneous coronary intervention (PCI) to the proximal left circumflex (pLCx) chronic total occlusion (). A left heart catheterization (LHC) on June 8, 2021, revealed no revascularizable lesions, and the left circumflex (LCx) was noted to be a small vessel with patent grafts. A transthoracic echocardiogram (TTE) from June 2021 showed moderate concentric left ventricular hypertrophy (LVH), normal left ventricular systolic function, no wall motion abnormalities (WMAs), and mild diastolic dysfunction. The patient is experiencing new, likely unstable angina, which has worsened over the past three months. Given his history of CABG and continued tobacco use, further evaluation with LHC is recommended Current management plan includes a referral for LHC and  TTE. Fifteen minutes were devoted to smoking cessation. The patient was counseled on current 7 cigarettes per day dependence and the effects of smoking on the patient's health / comorbidities, family and friends (emotional and secondhand smoke) as well as finances. Options for pharmacotherapy were proved, including nicotine patches and nicotine gum. The patient was also informed o option to call 2-972-FYGungroo, text [phone] or visit www.D square nv for further information, an online quit , or [id number] (6-week step-by-step text messaging program). Fter this discussion, the patient opted for nicotine patches. Additionally, various lab tests (A1C, lipid profile, CBC, BMP, coags, UA, and urine microalbumin) are planned

## 2024-09-12 NOTE — REASON FOR VISIT
[FreeTextEntry1] : Mr. Shi is a 64 year old Male current smoker, w/ HTN, HLD, type II DM, CAD s/p CABG (), with recent cardiac cath on 21 w/ unsuccessful PCI to pLCx , LHC at [facility] on 21 revealed no lesions that can be revascularized -- LCx was a small vessel. Patent grafts.  TTE from 2021 showed moderate concentric LVH, normal LV systolic function, no WMAs, mild diastolic dysfunction.  Since then, he was asymptomatic for a few years up until 3 months ago when he started developing a burning chest pain with exertion every time he walks ~2 blocks associated with dyspnea. Does not occur with rest. Takes Nitro SL with resolution of the pain, and resting similarly improves symptoms. He states this feels different than his chest pain prior to CABG. He currently smokes 7 cigarettes daily, but is very interested in quitting, as he has had 3 friends, also Faroese, who have  from [facility].  Of note, with me during interview, PHQ2 is negative.  Doesnt have a list of his medications but he and his daughter think he takes the folllowing: Aspirin Atorvastatin Nitro SL Metoprolol  Lisinopril Metformin Jardiance  Unknown other medications.   Social History: 7 cigarettes daily, no ETOH or Illicit drug use. Has HHA at home but walks independently. Eats Faroese food predominantly, has been trying to cut salt intake. Allergies: NKDA

## 2024-09-12 NOTE — REASON FOR VISIT
[FreeTextEntry1] : Mr. Shi is a 64 year old Male current smoker, w/ HTN, HLD, type II DM, CAD s/p CABG (), with recent cardiac cath on 21 w/ unsuccessful PCI to pLCx , LHC at [facility] on 21 revealed no lesions that can be revascularized -- LCx was a small vessel. Patent grafts.  TTE from 2021 showed moderate concentric LVH, normal LV systolic function, no WMAs, mild diastolic dysfunction.  Since then, he was asymptomatic for a few years up until 3 months ago when he started developing a burning chest pain with exertion every time he walks ~2 blocks associated with dyspnea. Does not occur with rest. Takes Nitro SL with resolution of the pain, and resting similarly improves symptoms. He states this feels different than his chest pain prior to CABG. He currently smokes 7 cigarettes daily, but is very interested in quitting, as he has had 3 friends, also Slovak, who have  from [facility].  Of note, with me during interview, PHQ2 is negative.  Doesnt have a list of his medications but he and his daughter think he takes the folllowing: Aspirin Atorvastatin Nitro SL Metoprolol  Lisinopril Metformin Jardiance  Unknown other medications.   Social History: 7 cigarettes daily, no ETOH or Illicit drug use. Has HHA at home but walks independently. Eats Slovak food predominantly, has been trying to cut salt intake. Allergies: NKDA

## 2024-09-12 NOTE — PHYSICAL EXAM
[Normal] : normal conjunctiva [Normal Venous Pressure] : normal venous pressure [No Carotid Bruit] : no carotid bruit [Normal S1, S2] : normal S1, S2 [No Murmur] : no murmur [Clear Lung Fields] : clear lung fields [Good Air Entry] : good air entry [Soft] : abdomen soft [Non Tender] : non-tender [Normal Gait] : normal gait [No Edema] : no edema [No Clubbing] : no clubbing [No Rash] : no rash [Moves all extremities] : moves all extremities [No Focal Deficits] : no focal deficits [Alert and Oriented] : alert and oriented [Normal memory] : normal memory

## 2024-09-12 NOTE — COUNSELING
[Cessation strategies including cessation program discussed] : Cessation strategies including cessation program discussed [Use of nicotine replacement therapies and other medications discussed] : Use of nicotine replacement therapies and other medications discussed [Smoking Cessation Program Referral] : Smoking Cessation Program Referral  [Yes] : Willing to quit smoking [FreeTextEntry2] : [phone] [FreeTextEntry3] : 12

## 2024-09-12 NOTE — COUNSELING
[Cessation strategies including cessation program discussed] : Cessation strategies including cessation program discussed [Use of nicotine replacement therapies and other medications discussed] : Use of nicotine replacement therapies and other medications discussed [Smoking Cessation Program Referral] : Smoking Cessation Program Referral  [Yes] : Willing to quit smoking [FreeTextEntry2] : [id number] [FreeTextEntry3] : 12

## 2024-09-18 ENCOUNTER — TRANSCRIPTION ENCOUNTER (OUTPATIENT)
Age: 64
End: 2024-09-18

## 2024-09-18 ENCOUNTER — OUTPATIENT (OUTPATIENT)
Dept: OUTPATIENT SERVICES | Facility: HOSPITAL | Age: 64
LOS: 1 days | Discharge: HOME | End: 2024-09-18
Payer: MEDICAID

## 2024-09-18 VITALS
SYSTOLIC BLOOD PRESSURE: 154 MMHG | RESPIRATION RATE: 18 BRPM | WEIGHT: 169.98 LBS | TEMPERATURE: 98 F | HEART RATE: 71 BPM | DIASTOLIC BLOOD PRESSURE: 71 MMHG | HEIGHT: 69 IN | OXYGEN SATURATION: 98 %

## 2024-09-18 VITALS
RESPIRATION RATE: 16 BRPM | SYSTOLIC BLOOD PRESSURE: 132 MMHG | HEART RATE: 67 BPM | OXYGEN SATURATION: 98 % | DIASTOLIC BLOOD PRESSURE: 76 MMHG

## 2024-09-18 DIAGNOSIS — Z95.1 PRESENCE OF AORTOCORONARY BYPASS GRAFT: Chronic | ICD-10-CM

## 2024-09-18 LAB
ANION GAP SERPL CALC-SCNC: 14 MMOL/L — SIGNIFICANT CHANGE UP (ref 7–14)
BUN SERPL-MCNC: 21 MG/DL — SIGNIFICANT CHANGE UP (ref 7–23)
CALCIUM SERPL-MCNC: 8.8 MG/DL — SIGNIFICANT CHANGE UP (ref 8.4–10.5)
CHLORIDE SERPL-SCNC: 101 MMOL/L — SIGNIFICANT CHANGE UP (ref 98–107)
CO2 SERPL-SCNC: 22 MMOL/L — SIGNIFICANT CHANGE UP (ref 22–31)
CREAT SERPL-MCNC: 2.53 MG/DL — HIGH (ref 0.5–1.3)
EGFR: 28 ML/MIN/1.73M2 — LOW
GLUCOSE BLDC GLUCOMTR-MCNC: 111 MG/DL — HIGH (ref 70–99)
GLUCOSE BLDC GLUCOMTR-MCNC: 131 MG/DL — HIGH (ref 70–99)
GLUCOSE SERPL-MCNC: 131 MG/DL — HIGH (ref 70–99)
HCT VFR BLD CALC: 45.3 % — SIGNIFICANT CHANGE UP (ref 39–50)
HGB BLD-MCNC: 14.3 G/DL — SIGNIFICANT CHANGE UP (ref 13–17)
MCHC RBC-ENTMCNC: 26.1 PG — LOW (ref 27–34)
MCHC RBC-ENTMCNC: 31.6 GM/DL — LOW (ref 32–36)
MCV RBC AUTO: 82.8 FL — SIGNIFICANT CHANGE UP (ref 80–100)
NRBC # BLD: 0 /100 WBCS — SIGNIFICANT CHANGE UP (ref 0–0)
NRBC # FLD: 0 K/UL — SIGNIFICANT CHANGE UP (ref 0–0)
PLATELET # BLD AUTO: 195 K/UL — SIGNIFICANT CHANGE UP (ref 150–400)
POTASSIUM SERPL-MCNC: 4.5 MMOL/L — SIGNIFICANT CHANGE UP (ref 3.5–5.3)
POTASSIUM SERPL-SCNC: 4.5 MMOL/L — SIGNIFICANT CHANGE UP (ref 3.5–5.3)
RBC # BLD: 5.47 M/UL — SIGNIFICANT CHANGE UP (ref 4.2–5.8)
RBC # FLD: 16 % — HIGH (ref 10.3–14.5)
SODIUM SERPL-SCNC: 137 MMOL/L — SIGNIFICANT CHANGE UP (ref 135–145)
WBC # BLD: 8.64 K/UL — SIGNIFICANT CHANGE UP (ref 3.8–10.5)
WBC # FLD AUTO: 8.64 K/UL — SIGNIFICANT CHANGE UP (ref 3.8–10.5)

## 2024-09-18 PROCEDURE — 93455 CORONARY ART/GRFT ANGIO S&I: CPT | Mod: 26

## 2024-09-18 PROCEDURE — 93010 ELECTROCARDIOGRAM REPORT: CPT

## 2024-09-18 RX ORDER — SODIUM CHLORIDE 9 MG/ML
500 INJECTION INTRAMUSCULAR; INTRAVENOUS; SUBCUTANEOUS
Refills: 0 | Status: DISCONTINUED | OUTPATIENT
Start: 2024-09-18 | End: 2024-10-02

## 2024-09-18 RX ORDER — EMPAGLIFLOZIN 10 MG/1
1 TABLET, FILM COATED ORAL
Refills: 0 | DISCHARGE

## 2024-09-18 RX ORDER — EZETIMIBE 10 MG/1
1 TABLET ORAL
Refills: 0 | DISCHARGE

## 2024-09-18 RX ORDER — SODIUM CHLORIDE 9 MG/ML
3 INJECTION INTRAMUSCULAR; INTRAVENOUS; SUBCUTANEOUS EVERY 8 HOURS
Refills: 0 | Status: DISCONTINUED | OUTPATIENT
Start: 2024-09-18 | End: 2024-10-02

## 2024-09-18 RX ORDER — OMEPRAZOLE 40 MG/1
1 CAPSULE, DELAYED RELEASE ORAL
Refills: 0 | DISCHARGE

## 2024-09-18 RX ORDER — SODIUM CHLORIDE 9 MG/ML
500 INJECTION INTRAMUSCULAR; INTRAVENOUS; SUBCUTANEOUS
Refills: 0 | Status: COMPLETED | OUTPATIENT
Start: 2024-09-18 | End: 2024-09-18

## 2024-09-18 RX ORDER — GLIPIZIDE 10 MG
1 TABLET ORAL
Refills: 0 | DISCHARGE

## 2024-09-18 RX ORDER — SODIUM CHLORIDE 9 MG/ML
250 INJECTION INTRAMUSCULAR; INTRAVENOUS; SUBCUTANEOUS ONCE
Refills: 0 | Status: COMPLETED | OUTPATIENT
Start: 2024-09-18 | End: 2024-09-18

## 2024-09-18 RX ORDER — FUROSEMIDE 40 MG
1 TABLET ORAL
Refills: 0 | DISCHARGE

## 2024-09-18 RX ADMIN — SODIUM CHLORIDE 75 MILLILITER(S): 9 INJECTION INTRAMUSCULAR; INTRAVENOUS; SUBCUTANEOUS at 14:45

## 2024-09-18 RX ADMIN — SODIUM CHLORIDE 1000 MILLILITER(S): 9 INJECTION INTRAMUSCULAR; INTRAVENOUS; SUBCUTANEOUS at 10:53

## 2024-09-18 RX ADMIN — SODIUM CHLORIDE 75 MILLILITER(S): 9 INJECTION INTRAMUSCULAR; INTRAVENOUS; SUBCUTANEOUS at 10:53

## 2024-09-18 RX ADMIN — SODIUM CHLORIDE 3 MILLILITER(S): 9 INJECTION INTRAMUSCULAR; INTRAVENOUS; SUBCUTANEOUS at 14:00

## 2024-09-18 NOTE — ASU DISCHARGE PLAN (ADULT/PEDIATRIC) - CARE PROVIDER_API CALL
Tila Weinstein  Internal Medicine  [address], [address]  [place] [address]  Phone: [phone]  Fax: [phone]  Follow Up Time:

## 2024-09-18 NOTE — H&P CARDIOLOGY - HISTORY OF PRESENT ILLNESS
64 year old male with HTN, hyperlipidemia, DM type 2, known CAD with 3V CABG in [place], CKD who presented to his Cardiologist complaining of   Underwent a  In light of patients cardiac risk factors, symptoms and abnormal noninvasive test findings there is high suspicion for CAD. Patient is now referred to [facility] for a cardiac catheterization with possible PTCA/stent.     Referring MD:  64 year old male current smoker with HTN, hyperlipidemia, DM type 2, known CAD with CABG (Patent LIMA to LAD, Free radial graft to D-1, Y-SVG to RPDA/RPLS) in [place] 2004 and cath 2021 at [facility] with patent grafts, medical therapy, CKD who presented to his Cardiologist complaining of chest pain for the past 1 month at varied times during the night that wakes him from sleep, relieved with NTG patch or NTG SL.  Admits to SOB walking less than 1 block, relieved with rest.  Admits to increase in fatigue and decrease in exercise tolerance.  Denies dizziness, synocpe, edema, orthopnea and PND. Referred directly for cath.   In light of patients CAD history and symptoms there is high suspicion for CAD progression. Patient is now referred to [facility] for a cardiac catheterization with possible PTCA/stent.     Referring MD: Dr Weinstein    Cardiac cath 6/8/21 LAD:   --  Proximal LAD: There was a 100 % stenosis. This lesion is a  chronic total occlusion.  CX:   --  Circumflex: The vessel was small sized. Angiography showed severe  atherosclerosis.  RCA:   --  Proximal RCA: There was a 100 % stenosis. This lesion is a  chronic total occlusion.  GRAFTS:   --  Graft to the LAD: The graft was a LIMA. Distal vessel  angiography showed a small to medium sized vessel and mild diffuse  disease.  --  Graft to the 1st diagonal: The graft was a free radial from the  ascending aorta. Distal vessel angiography showed mild diffuse disease.  --  Graft to the right posterolateral segment: The graft was a saphenous  vein y-graft from the ascending aorta. Graft angiography showed mild  degeneration. Distal vessel angiography showed mild diffuse disease.  --  Graft to the RPDA: The graft was a medium sized saphenous vein y-graft.  Distal vessel angiography showed mild diffuse disease.  COMPLICATIONS: There were no complications.  DIAGNOSTIC IMPRESSIONS: Patent KEVIN to LAD, Free radial graft to D-1, Y-SVG  to RPDA/RPLS  Native LCx is small with diffuse ds, medical management is recommended  DIAGNOSTIC RECOMMENDATIONS: Medical therapy  INTERVENTIONAL IMPRESSIONS: Patent KEVIN to LAD, Free radial graft to D-1,  Y-SVG to RPDA/RPLS  Native LCx is small with diffuse ds, medical management is recommended  INTERVENTIONAL RECOMMENDATIONS: Medical therapy

## 2024-09-18 NOTE — H&P CARDIOLOGY - NSICDXPASTMEDICALHX_GEN_ALL_CORE_FT
PAST MEDICAL HISTORY:  CAD (coronary artery disease)     Chronic kidney disease (CKD) told his creat was elevated in 2020, unsure of levels    Diabetes     Gastritis     Hyperlipemia     Hypertension      PAST MEDICAL HISTORY:  CAD (coronary artery disease)     Chronic kidney disease (CKD) told his creat was elevated in 2020, unsure of levels    Current smoker     Diabetes     Gastritis     GERD (gastroesophageal reflux disease)     Hyperlipemia     Hypertension

## 2024-09-18 NOTE — H&P CARDIOLOGY - SOURCE
- reliable and medical records/Patient - fair historian with mary translating confirmed with  service [id number]  and medical records/Patient

## 2024-09-18 NOTE — ASU DISCHARGE PLAN (ADULT/PEDIATRIC) - ASU DC SPECIAL INSTRUCTIONSFT
WOUND CARE:  The day after your procedure:  - Remove the bandage at the site gently, clean with a small amount of soap and water, and pat try. Leave open to air.  - You may shower.  - Do not apply lotions, creams, ointments, powder, or perfumes to your incision site.  - Check your groin daily. A small amount of bruising or soreness is normal.  - Do not soak the procedural site for 1 week (no baths, pools, tubs, etc).  --------------------------------------------------  ACTIVITY:  For the next 24 hours:  - Do not drive or operate heavy machinery.  - Do not drink any alcoholic beverages.  - Do not make any important decisions or sign legal documents.  --------------------------------------------  If your procedure was performed through the groin,  For the next 5 days:  - Limit climbing stairs.  - Do not soak in a bathtub or pool.  - Avoid strenuous activities (pushing, pulling, straining).  - Do not lift anything more than 10 pounds.  ------------------------------------------------  MEDICATION:   - Take your medications as explained (see attached medication reconciliation on discharge paperwork).  - HOLD METFORMIN AND RESUME ON SATURDAY 9/21  ------------------------------------------------  ADDITIONAL INSTRUCTIONS:  - Follow a heart healthy diet recommended by your doctor.   - If you smoke, we encourage smoking cessation. You may call [phone] for center of tobacco control if you need assistance.  - Call your doctor to make appointment within 2 weeks.  - HAVE BLOOD WORK TO CHECK YOUR KIDNEY FUNCTION IN 3-4 days with your primary doctor or cardiologist post procedure   -------------------------------------------------  ***CALL YOUR DOCTOR***  - If you experience fever, chills, body aches, or severe pain, swelling, redness, heat, or yellow discharge from your incision site.  - If you notice bleeding or significant swelling at the procedural site.  - If you experience chest pain.  - If you experience extremity numbness, tingling, or temperature change.  --------------------------------------------------  If you are unable to get in contact with your doctor, you may contact the [facility] at [phone].  Please reference your discharge instructions for any questions or concerns.

## 2024-09-18 NOTE — ASU PATIENT PROFILE, ADULT - FALL HARM RISK - RISK INTERVENTIONS

## 2024-09-18 NOTE — H&P CARDIOLOGY - NS MD HP PULSE RADIAL
denisse test normal right/right normal/left normal denisse test normal right; left faint (radial for cabg)/right normal

## 2024-09-18 NOTE — H&P CARDIOLOGY - PATIENT'S SEXUAL ORIENTATION
From: Sanjana Canchola  To: Physician Susana Miller  Sent: 5/10/2021 8:06 PM PDT  Subject: Prescription Question    I am currently taking Metformin, as prescribed by my previous primary care physician. I am at the end of my supply, and need to renew.   Heterosexual

## 2024-09-18 NOTE — H&P CARDIOLOGY - COMMENTS
IVF hydration     Pre-sedation Evaluation  Anticoagulation last dose: not applicable  Dentures: None  Last PO intake: 9/17 at 2000  Obstructive sleep apnea: No  Aspiration risk: No  Mallampati score: 2  ASA Classification: 2  Prior Sedative or Anesthesia Experience: No complications  Informed consent by responsible adult: Yes  Responsible adult escort: Yes  Based on today's assessment, anesthesia consult requested: No

## 2024-09-25 ENCOUNTER — APPOINTMENT (OUTPATIENT)
Dept: CV DIAGNOSITCS | Facility: HOSPITAL | Age: 64
End: 2024-09-25

## 2024-09-25 ENCOUNTER — OUTPATIENT (OUTPATIENT)
Dept: OUTPATIENT SERVICES | Facility: HOSPITAL | Age: 64
LOS: 1 days | End: 2024-09-25
Payer: MEDICAID

## 2024-09-25 ENCOUNTER — RESULT REVIEW (OUTPATIENT)
Age: 64
End: 2024-09-25

## 2024-09-25 DIAGNOSIS — Z95.1 PRESENCE OF AORTOCORONARY BYPASS GRAFT: Chronic | ICD-10-CM

## 2024-09-25 DIAGNOSIS — I25.118 ATHEROSCLEROTIC HEART DISEASE OF NATIVE CORONARY ARTERY WITH OTHER FORMS OF ANGINA PECTORIS: ICD-10-CM

## 2024-09-25 PROBLEM — K21.9 GASTRO-ESOPHAGEAL REFLUX DISEASE WITHOUT ESOPHAGITIS: Chronic | Status: ACTIVE | Noted: 2024-09-18

## 2024-09-25 PROBLEM — F17.200 NICOTINE DEPENDENCE, UNSPECIFIED, UNCOMPLICATED: Chronic | Status: ACTIVE | Noted: 2024-09-18

## 2024-09-25 PROCEDURE — 93306 TTE W/DOPPLER COMPLETE: CPT | Mod: 26

## 2024-11-13 ENCOUNTER — NON-APPOINTMENT (OUTPATIENT)
Age: 64
End: 2024-11-13

## 2024-11-13 ENCOUNTER — APPOINTMENT (OUTPATIENT)
Dept: CARDIOLOGY | Facility: HOSPITAL | Age: 64
End: 2024-11-13

## 2024-11-13 VITALS
HEIGHT: 69 IN | OXYGEN SATURATION: 97 % | BODY MASS INDEX: 24.88 KG/M2 | DIASTOLIC BLOOD PRESSURE: 62 MMHG | HEART RATE: 62 BPM | SYSTOLIC BLOOD PRESSURE: 118 MMHG | WEIGHT: 168 LBS

## 2024-11-13 DIAGNOSIS — R06.00 DYSPNEA, UNSPECIFIED: ICD-10-CM

## 2024-11-13 DIAGNOSIS — R94.31 ABNORMAL ELECTROCARDIOGRAM [ECG] [EKG]: ICD-10-CM

## 2024-11-13 DIAGNOSIS — F17.200 NICOTINE DEPENDENCE, UNSPECIFIED, UNCOMPLICATED: ICD-10-CM

## 2024-11-13 DIAGNOSIS — E11.51 TYPE 2 DIABETES MELLITUS WITH DIABETIC PERIPHERAL ANGIOPATHY W/OUT GANGRENE: ICD-10-CM

## 2024-11-13 DIAGNOSIS — I25.118 ATHEROSCLEROTIC HEART DISEASE OF NATIVE CORONARY ARTERY WITH OTHER FORMS OF ANGINA PECTORIS: ICD-10-CM

## 2025-03-19 ENCOUNTER — APPOINTMENT (OUTPATIENT)
Dept: CARDIOLOGY | Facility: HOSPITAL | Age: 65
End: 2025-03-19

## 2025-04-30 ENCOUNTER — APPOINTMENT (OUTPATIENT)
Dept: CARDIOLOGY | Facility: HOSPITAL | Age: 65
End: 2025-04-30